# Patient Record
Sex: FEMALE | Race: WHITE | NOT HISPANIC OR LATINO | Employment: PART TIME
[De-identification: names, ages, dates, MRNs, and addresses within clinical notes are randomized per-mention and may not be internally consistent; named-entity substitution may affect disease eponyms.]

---

## 2019-05-01 ENCOUNTER — TRANSCRIBE ORDERS (OUTPATIENT)
Dept: SCHEDULING | Age: 54
End: 2019-05-01

## 2019-05-01 DIAGNOSIS — Z12.31 ENCOUNTER FOR SCREENING MAMMOGRAM FOR MALIGNANT NEOPLASM OF BREAST: Primary | ICD-10-CM

## 2019-05-10 ENCOUNTER — HOSPITAL ENCOUNTER (EMERGENCY)
Facility: HOSPITAL | Age: 54
Discharge: HOME | End: 2019-05-10
Attending: EMERGENCY MEDICINE
Payer: COMMERCIAL

## 2019-05-10 VITALS
HEIGHT: 60 IN | DIASTOLIC BLOOD PRESSURE: 60 MMHG | WEIGHT: 110 LBS | BODY MASS INDEX: 21.6 KG/M2 | TEMPERATURE: 98.9 F | SYSTOLIC BLOOD PRESSURE: 112 MMHG | RESPIRATION RATE: 18 BRPM | OXYGEN SATURATION: 99 % | HEART RATE: 70 BPM

## 2019-05-10 DIAGNOSIS — R19.7 VOMITING AND DIARRHEA: Primary | ICD-10-CM

## 2019-05-10 DIAGNOSIS — E87.6 HYPOKALEMIA: ICD-10-CM

## 2019-05-10 DIAGNOSIS — R11.10 VOMITING AND DIARRHEA: Primary | ICD-10-CM

## 2019-05-10 LAB
ANION GAP SERPL CALC-SCNC: 9 MEQ/L (ref 3–15)
ATRIAL RATE: 68
BASOPHILS # BLD: 0.06 K/UL (ref 0.01–0.1)
BASOPHILS NFR BLD: 1.3 %
BILIRUB UR QL STRIP.AUTO: NEGATIVE MG/DL
BILIRUB UR QL STRIP.AUTO: NEGATIVE MG/DL
BUN SERPL-MCNC: 11 MG/DL (ref 8–20)
CALCIUM SERPL-MCNC: 9.5 MG/DL (ref 8.9–10.3)
CHLORIDE SERPL-SCNC: 105 MEQ/L (ref 98–109)
CLARITY UR REFRACT.AUTO: CLEAR
CLARITY UR REFRACT.AUTO: CLEAR
CO2 SERPL-SCNC: 25 MEQ/L (ref 22–32)
COLOR UR AUTO: YELLOW
COLOR UR AUTO: YELLOW
CREAT SERPL-MCNC: 0.6 MG/DL
DIFFERENTIAL METHOD BLD: NORMAL
EOSINOPHIL # BLD: 0.07 K/UL (ref 0.04–0.36)
EOSINOPHIL NFR BLD: 1.5 %
ERYTHROCYTE [DISTWIDTH] IN BLOOD BY AUTOMATED COUNT: 11.9 % (ref 11.7–14.4)
GFR SERPL CREATININE-BSD FRML MDRD: >60 ML/MIN/1.73M*2
GLUCOSE BLD-MCNC: 132 MG/DL (ref 70–99)
GLUCOSE SERPL-MCNC: 145 MG/DL (ref 70–99)
GLUCOSE UR STRIP.AUTO-MCNC: NEGATIVE MG/DL
GLUCOSE UR STRIP.AUTO-MCNC: NEGATIVE MG/DL
HCT VFR BLDCO AUTO: 35.9 %
HGB BLD-MCNC: 12.4 G/DL
HGB UR QL STRIP.AUTO: ABNORMAL
HGB UR QL STRIP.AUTO: NEGATIVE
IMM GRANULOCYTES # BLD AUTO: 0.01 K/UL (ref 0–0.08)
IMM GRANULOCYTES NFR BLD AUTO: 0.2 %
KETONES UR STRIP.AUTO-MCNC: NEGATIVE MG/DL
KETONES UR STRIP.AUTO-MCNC: NEGATIVE MG/DL
LEUKOCYTE ESTERASE UR QL STRIP.AUTO: NEGATIVE
LEUKOCYTE ESTERASE UR QL STRIP.AUTO: NEGATIVE
LYMPHOCYTES # BLD: 1.5 K/UL (ref 1.2–3.5)
LYMPHOCYTES NFR BLD: 31.8 %
MCH RBC QN AUTO: 30.7 PG (ref 28–33.2)
MCHC RBC AUTO-ENTMCNC: 34.5 G/DL (ref 32.2–35.5)
MCV RBC AUTO: 88.9 FL (ref 83–98)
MONOCYTES # BLD: 0.36 K/UL (ref 0.28–0.8)
MONOCYTES NFR BLD: 7.6 %
NEUTROPHILS # BLD: 2.72 K/UL (ref 1.7–7)
NEUTS SEG NFR BLD: 57.6 %
NITRITE UR QL STRIP.AUTO: NEGATIVE
NITRITE UR QL STRIP.AUTO: NEGATIVE
NRBC BLD-RTO: 0 %
P AXIS: 90
PDW BLD AUTO: 9.3 FL (ref 9.4–12.3)
PH UR STRIP.AUTO: 7.5 [PH]
PH UR STRIP.AUTO: 8 [PH]
PLATELET # BLD AUTO: 277 K/UL
POCT TEST (UMAC): ABNORMAL
POCT TEST: ABNORMAL
POTASSIUM SERPL-SCNC: 3.2 MEQ/L (ref 3.6–5.1)
PR INTERVAL: 182
PROT UR QL STRIP.AUTO: NEGATIVE
PROT UR QL STRIP.AUTO: NEGATIVE
QRS DURATION: 76
QT INTERVAL: 430
QTC CALCULATION(BAZETT): 457
R AXIS: 71
RBC # BLD AUTO: 4.04 M/UL (ref 3.93–5.22)
SODIUM SERPL-SCNC: 139 MEQ/L (ref 136–144)
SP GR UR REFRACT.AUTO: 1.01
SP GR UR REFRACT.AUTO: 1.01
T WAVE AXIS: 91
TROPONIN I SERPL-MCNC: <0.03 NG/ML
UROBILINOGEN UR STRIP-ACNC: 0.2 EU/DL
UROBILINOGEN UR STRIP-ACNC: 0.2 EU/DL
VENTRICULAR RATE: 68
WBC # BLD AUTO: 4.72 K/UL

## 2019-05-10 PROCEDURE — 93010 ELECTROCARDIOGRAM REPORT: CPT | Performed by: INTERNAL MEDICINE

## 2019-05-10 PROCEDURE — 25800000 HC PHARMACY IV SOLUTIONS: Performed by: PHYSICIAN ASSISTANT

## 2019-05-10 PROCEDURE — 93005 ELECTROCARDIOGRAM TRACING: CPT | Performed by: EMERGENCY MEDICINE

## 2019-05-10 PROCEDURE — 80048 BASIC METABOLIC PNL TOTAL CA: CPT | Performed by: EMERGENCY MEDICINE

## 2019-05-10 PROCEDURE — 99284 EMERGENCY DEPT VISIT MOD MDM: CPT | Mod: 25

## 2019-05-10 PROCEDURE — 85025 COMPLETE CBC W/AUTO DIFF WBC: CPT

## 2019-05-10 PROCEDURE — 93005 ELECTROCARDIOGRAM TRACING: CPT

## 2019-05-10 PROCEDURE — 85025 COMPLETE CBC W/AUTO DIFF WBC: CPT | Performed by: EMERGENCY MEDICINE

## 2019-05-10 PROCEDURE — 36415 COLL VENOUS BLD VENIPUNCTURE: CPT

## 2019-05-10 PROCEDURE — 81003 URINALYSIS AUTO W/O SCOPE: CPT | Performed by: PHYSICIAN ASSISTANT

## 2019-05-10 PROCEDURE — 84484 ASSAY OF TROPONIN QUANT: CPT

## 2019-05-10 PROCEDURE — 63700000 HC SELF-ADMINISTRABLE DRUG: Performed by: PHYSICIAN ASSISTANT

## 2019-05-10 PROCEDURE — 84484 ASSAY OF TROPONIN QUANT: CPT | Performed by: EMERGENCY MEDICINE

## 2019-05-10 PROCEDURE — 80048 BASIC METABOLIC PNL TOTAL CA: CPT

## 2019-05-10 PROCEDURE — 63600000 HC DRUGS/DETAIL CODE

## 2019-05-10 RX ORDER — ONDANSETRON 4 MG/1
4 TABLET, ORALLY DISINTEGRATING ORAL EVERY 8 HOURS PRN
Qty: 12 TABLET | Refills: 0 | Status: SHIPPED | OUTPATIENT
Start: 2019-05-10 | End: 2020-07-21 | Stop reason: ALTCHOICE

## 2019-05-10 RX ORDER — ONDANSETRON HYDROCHLORIDE 2 MG/ML
4 INJECTION, SOLUTION INTRAVENOUS ONCE
Status: COMPLETED | OUTPATIENT
Start: 2019-05-10 | End: 2019-05-10

## 2019-05-10 RX ORDER — POTASSIUM CHLORIDE 750 MG/1
40 TABLET, FILM COATED, EXTENDED RELEASE ORAL ONCE
Status: COMPLETED | OUTPATIENT
Start: 2019-05-10 | End: 2019-05-10

## 2019-05-10 RX ORDER — ONDANSETRON HYDROCHLORIDE 2 MG/ML
INJECTION, SOLUTION INTRAVENOUS
Status: COMPLETED
Start: 2019-05-10 | End: 2019-05-10

## 2019-05-10 RX ADMIN — SODIUM CHLORIDE 1000 ML: 9 INJECTION, SOLUTION INTRAVENOUS at 08:41

## 2019-05-10 RX ADMIN — ONDANSETRON 4 MG: 2 INJECTION INTRAMUSCULAR; INTRAVENOUS at 08:40

## 2019-05-10 RX ADMIN — POTASSIUM CHLORIDE 40 MEQ: 750 TABLET, FILM COATED, EXTENDED RELEASE ORAL at 10:34

## 2019-05-10 RX ADMIN — ONDANSETRON HYDROCHLORIDE 4 MG: 2 INJECTION, SOLUTION INTRAVENOUS at 08:40

## 2019-05-10 RX ADMIN — SODIUM CHLORIDE 1000 ML: 900 INJECTION, SOLUTION INTRAVENOUS at 09:48

## 2019-05-10 ASSESSMENT — ENCOUNTER SYMPTOMS
CHILLS: 0
SHORTNESS OF BREATH: 0
MYALGIAS: 0
FEVER: 0
ABDOMINAL PAIN: 0
DIARRHEA: 1
HEADACHES: 0
VOMITING: 1
NAUSEA: 1
WEAKNESS: 0
DIZZINESS: 1
SPEECH DIFFICULTY: 0

## 2019-05-10 NOTE — DISCHARGE INSTRUCTIONS
Follow-up with PCP next week for reevaluation.  Please return sooner for new or worsening concerns

## 2019-05-10 NOTE — ED ATTESTATION NOTE
Mary Brandon was evaluated and managed by the physician assistant.  We discussed the plan of care, and I reviewed the relevant studies.       Mar Campos MD  05/10/19 3878

## 2019-05-10 NOTE — ED PROVIDER NOTES
HPI     Chief Complaint   Patient presents with   • Dizziness       Pt is a 54-year-old female who presents to ER for evaluation of N/V/D. She reports she has been fasting for Ramadan and this am at 4AM she had soup, rice beans and water. She reports about 3 hours later she started feeling dizzy, like the room was spinning, and then felt nauseous and started vomiting. She reports total of 6 episodes of vomiting and 3-4 episodes of loose, brown, watery stools. Pt denies fevers, chills, abdominal pain. She denies recent cold like sx, ear pain, tinnitus. Pt denies chest pain, sob. She denies sick contacts, recent abx, or travel.             Patient History     History reviewed. No pertinent past medical history.    Past Surgical History:   Procedure Laterality Date   • TONSILLECTOMY         History reviewed. No pertinent family history.    Social History   Substance Use Topics   • Smoking status: Never Smoker   • Smokeless tobacco: Never Used   • Alcohol use No       Systems Reviewed from Nursing Triage:          Review of Systems     Review of Systems   Constitutional: Negative for chills and fever.   Eyes: Negative for visual disturbance ( ).   Respiratory: Negative for shortness of breath.    Cardiovascular: Negative for chest pain.   Gastrointestinal: Positive for diarrhea, nausea and vomiting. Negative for abdominal pain.   Musculoskeletal: Negative for myalgias.   Skin: Negative for rash.   Neurological: Positive for dizziness. Negative for speech difficulty, weakness and headaches.        Physical Exam     ED Triage Vitals   Temp Heart Rate Resp BP SpO2   05/10/19 0810 05/10/19 0810 05/10/19 0810 05/10/19 0810 05/10/19 0810   36.4 °C (97.6 °F) 72 18 117/66 100 %      Temp Source Heart Rate Source Patient Position BP Location FiO2 (%) (Set)   05/10/19 0936 05/10/19 0936 05/10/19 0810 05/10/19 0810 --   Oral Monitor Sitting Right upper arm        Pulse Ox %: 100 % (05/10/19 0921)  Pulse Ox Interpretation: Normal  (05/10/19 0921)  Heart Rate: 72 (05/10/19 0921)  Rhythm Strip Interpretation: Normal Sinus Rhythm (05/10/19 0921)    No data found.          Physical Exam   Constitutional: She is oriented to person, place, and time. She appears distressed.   HENT:   Right Ear: Tympanic membrane and external ear normal.   Left Ear: Tympanic membrane and external ear normal.   Mouth/Throat: Oropharynx is clear and moist.   Eyes: Pupils are equal, round, and reactive to light.   Neck: Normal range of motion.   Cardiovascular: Regular rhythm and normal heart sounds.    Pulmonary/Chest: Breath sounds normal.   Abdominal: Soft. There is no tenderness.   Musculoskeletal: Normal range of motion.   Neurological: She is alert and oriented to person, place, and time. No cranial nerve deficit. Coordination normal.   Skin: Skin is warm and dry.   Nursing note and vitals reviewed.           Procedures    ED Course & MDM     Labs Reviewed   BASIC METABOLIC PANEL - Abnormal        Result Value    Sodium 139      Potassium 3.2 (*)     Chloride 105      CO2 25      BUN 11      Creatinine 0.6      Glucose 145 (*)     Calcium 9.5      eGFR >60.0      Anion Gap 9     CBC - Abnormal     WBC 4.72      RBC 4.04      Hemoglobin 12.4      Hematocrit 35.9      MCV 88.9      MCH 30.7      MCHC 34.5      RDW 11.9      Platelets 277      MPV 9.3 (*)    POCT GLUCOSE (BEAKER) - Abnormal     POCT Bedside Glucose 132 (*)     POC Test POC     POCT URINALYSIS (BEAKER) - Abnormal     Color, Urine Yellow      Clarity, Urine Clear      Specific Gravity, Urine 1.015      pH, Urine 8.0      Leukocytes, Urine Negative      Nitrite, Urine Negative      Protein, Urine Negative      Glucose, Urine Negative      Ketones, Urine Negative      Urobilinogen, Urine 0.2      Bilirubin, Urine Negative      Blood, Urine Trace-intact (*)     POC Test POC     TROPONIN I - Normal    Troponin I <0.03     UA REFLEX CULTURE (MACROSCOPIC) - Normal    Color, Urine Yellow      Clarity, Urine  Clear      Specific Gravity, Urine 1.006      pH, Urine 7.5      Leukocyte Esterase Negative      Nitrite, Urine Negative      Protein, Urine Negative      Glucose, Urine Negative      Ketones, Urine Negative      Urobilinogen, Urine 0.2      Bilirubin, Urine Negative      Blood, Urine Negative     CBC AND DIFFERENTIAL    Narrative:     The following orders were created for panel order CBC and differential.  Procedure                               Abnormality         Status                     ---------                               -----------         ------                     CBC[[id number]]                           Abnormal            Final result               Diff Count[[id number]]                                        Final result                 Please view results for these tests on the individual orders.   RAINBOW DRAW PANEL    Narrative:     The following orders were created for panel order [place] Draw Panel.  Procedure                               Abnormality         Status                     ---------                               -----------         ------                     RAINBOW LT BLUE[[id number]]                                   Final result                 Please view results for these tests on the individual orders.   DIFF COUNT    Differential Type Auto      nRBC 0.0      Immature Granulocytes 0.2      Neutrophils 57.6      Lymphocytes 31.8      Monocytes 7.6      Eosinophils 1.5      Basophils 1.3      Immature Granulocytes, Absolute 0.01      Neutrophils, Absolute 2.72      Lymphocytes, Absolute 1.50      Monocytes, Absolute 0.36      Eosinophils, Absolute 0.07      Basophils, Absolute 0.06     URINALYSIS REFLEX CULTURE    Narrative:     The following orders were created for panel order Urinalysis w/ reflex culture.  Procedure                               Abnormality         Status                     ---------                               -----------         ------                     UA  Reflex to Culture (Mac...[[id number]]  Normal              Final result                 Please view results for these tests on the individual orders.   RAINBOW LT BLUE       ECG 12 lead   Final Result                  MDM  Number of Diagnoses or Management Options  Hypokalemia:   Vomiting and diarrhea:   Diagnosis management comments: Pt presents to er for eval of v/d. She is fasting for Ramadan, abd soft, NT.  Labs reviewed  K 3.2     After antiemetics and 2L of fluids pt tolerating fluids and feeling better  Pt also seen by Dr. Campos who was in agreement with plan of care               ED Course as of May 13 2301   Fri May 10, 2019   1045 Walked to bathroom, feeling better  [AD]   1234 Tolerating POs  [AD]      ED Course User Index  [AD] Ladonna Walker PA C         Clinical Impressions as of May 13 2301   Vomiting and diarrhea   Hypokalemia        Ladonna Walker PA C  05/13/19 2301

## 2019-05-14 ENCOUNTER — TRANSCRIBE ORDERS (OUTPATIENT)
Dept: RADIOLOGY | Facility: HOSPITAL | Age: 54
End: 2019-05-14

## 2019-05-14 ENCOUNTER — HOSPITAL ENCOUNTER (OUTPATIENT)
Dept: RADIOLOGY | Facility: HOSPITAL | Age: 54
Discharge: HOME | End: 2019-05-14
Attending: PHYSICIAN ASSISTANT
Payer: COMMERCIAL

## 2019-05-14 DIAGNOSIS — Z12.31 ENCOUNTER FOR SCREENING MAMMOGRAM FOR MALIGNANT NEOPLASM OF BREAST: ICD-10-CM

## 2019-05-14 PROCEDURE — 77063 BREAST TOMOSYNTHESIS BI: CPT

## 2019-07-10 ENCOUNTER — TRANSCRIBE ORDERS (OUTPATIENT)
Dept: SCHEDULING | Age: 54
End: 2019-07-10

## 2019-07-10 DIAGNOSIS — R74.8 ABNORMAL LEVELS OF OTHER SERUM ENZYMES: Primary | ICD-10-CM

## 2019-07-24 ENCOUNTER — HOSPITAL ENCOUNTER (OUTPATIENT)
Dept: RADIOLOGY | Facility: HOSPITAL | Age: 54
Discharge: HOME | End: 2019-07-24
Attending: PHYSICIAN ASSISTANT
Payer: COMMERCIAL

## 2019-07-24 DIAGNOSIS — R74.8 ABNORMAL LEVELS OF OTHER SERUM ENZYMES: ICD-10-CM

## 2019-07-24 PROCEDURE — 76700 US EXAM ABDOM COMPLETE: CPT

## 2020-06-12 ENCOUNTER — HOSPITAL ENCOUNTER (OUTPATIENT)
Dept: RADIOLOGY | Facility: HOSPITAL | Age: 55
Discharge: HOME | End: 2020-06-12
Attending: FAMILY MEDICINE
Payer: COMMERCIAL

## 2020-06-12 ENCOUNTER — TRANSCRIBE ORDERS (OUTPATIENT)
Dept: RADIOLOGY | Facility: HOSPITAL | Age: 55
End: 2020-06-12

## 2020-06-12 DIAGNOSIS — Z12.31 ENCOUNTER FOR SCREENING MAMMOGRAM FOR MALIGNANT NEOPLASM OF BREAST: ICD-10-CM

## 2020-06-12 DIAGNOSIS — Z12.31 ENCOUNTER FOR SCREENING MAMMOGRAM FOR MALIGNANT NEOPLASM OF BREAST: Primary | ICD-10-CM

## 2020-06-12 PROCEDURE — 77067 SCR MAMMO BI INCL CAD: CPT

## 2020-07-21 ENCOUNTER — OFFICE VISIT (OUTPATIENT)
Dept: FAMILY MEDICINE | Facility: CLINIC | Age: 55
End: 2020-07-21
Payer: COMMERCIAL

## 2020-07-21 ENCOUNTER — TELEPHONE (OUTPATIENT)
Dept: SCHEDULING | Facility: CLINIC | Age: 55
End: 2020-07-21

## 2020-07-21 VITALS
WEIGHT: 114 LBS | SYSTOLIC BLOOD PRESSURE: 120 MMHG | DIASTOLIC BLOOD PRESSURE: 80 MMHG | OXYGEN SATURATION: 98 % | TEMPERATURE: 98.9 F | HEIGHT: 64 IN | HEART RATE: 79 BPM | BODY MASS INDEX: 19.46 KG/M2 | RESPIRATION RATE: 16 BRPM

## 2020-07-21 DIAGNOSIS — R79.89 ELEVATED LIVER FUNCTION TESTS: ICD-10-CM

## 2020-07-21 DIAGNOSIS — K76.1 HEPATIC CONGESTION: ICD-10-CM

## 2020-07-21 DIAGNOSIS — R76.8 ANA POSITIVE: Primary | ICD-10-CM

## 2020-07-21 PROCEDURE — 99203 OFFICE O/P NEW LOW 30 MIN: CPT | Performed by: INTERNAL MEDICINE

## 2020-07-21 ASSESSMENT — ENCOUNTER SYMPTOMS
SEIZURES: 0
SINUS PRESSURE: 0
DIARRHEA: 0
SHORTNESS OF BREATH: 0
PHOTOPHOBIA: 0
DIZZINESS: 0
EYE ITCHING: 0
APPETITE CHANGE: 0
FACIAL ASYMMETRY: 0
SINUS PAIN: 0
BLOOD IN STOOL: 0
POLYDIPSIA: 0
FEVER: 0
WEAKNESS: 0
DYSURIA: 0
PSYCHIATRIC NEGATIVE: 1
BACK PAIN: 0
DIAPHORESIS: 0
CHOKING: 0
LIGHT-HEADEDNESS: 0
WHEEZING: 0
COUGH: 0
TREMORS: 0
EYE REDNESS: 0
ABDOMINAL DISTENTION: 0
JOINT SWELLING: 0
CHILLS: 0
EYE DISCHARGE: 0
ADENOPATHY: 0
NAUSEA: 0
NECK PAIN: 0
UNEXPECTED WEIGHT CHANGE: 0
ANAL BLEEDING: 0
ACTIVITY CHANGE: 0
RHINORRHEA: 0
FATIGUE: 0
HEADACHES: 0
NECK STIFFNESS: 0
SPEECH DIFFICULTY: 0
DIFFICULTY URINATING: 0
VOICE CHANGE: 0
POLYPHAGIA: 0
BRUISES/BLEEDS EASILY: 0
PALPITATIONS: 0
NUMBNESS: 0
ABDOMINAL PAIN: 0
MYALGIAS: 0
SORE THROAT: 0
VOMITING: 0
EYE PAIN: 0
CONSTIPATION: 0
STRIDOR: 0
TROUBLE SWALLOWING: 0
FREQUENCY: 0
FLANK PAIN: 0
RECTAL PAIN: 0
ARTHRALGIAS: 0
APNEA: 0
HEMATURIA: 0
CHEST TIGHTNESS: 0

## 2020-07-21 NOTE — PROGRESS NOTES
Subjective      Patient ID: Mary Brandon is a 55 y.o. female.  1965      HPI     56 yo female here to establish care and up on chronic conditions. She is new to office. Prev f/b PCOM. She is f/b Dr. Mitchell-rheum for +NAEL. Also found to have elevated ast/alt in past. This prompted liver u/s and subsequent labs to be drawn. Referred to hepatology by rheumatology. Recently had liver bx by this provider which showed congestive hepatopathy. It was rec pt f/u with cardiology for echo, cardiac eval. Pt denies any known hx of CHF, CVA, HTN, CAD, CKD, DM, HLD, etc.she stays active and feels well. Denies any dyspnea at rest or with exertion, CP, orthopnea, PND, edema, LH, syncope, fatigue, palpitations. No n/v or abd pain. No fever or unexplained wt changes. No BM concerns.   Labs noted 5/2020 with nml cbc, ALT of 46, otw nml hepatic function panel.      The following have been reviewed and updated as appropriate in this visit:  Tobacco  Allergies  Meds  Surg Hx  Fam Hx  Soc Hx      Review of Systems   Constitutional: Negative for activity change, appetite change, chills, diaphoresis, fatigue, fever and unexpected weight change.   HENT: Negative for congestion, ear discharge, hearing loss, mouth sores, nosebleeds, postnasal drip, rhinorrhea, sinus pressure, sinus pain, sneezing, sore throat, tinnitus, trouble swallowing and voice change.    Eyes: Negative for photophobia, pain, discharge, redness, itching and visual disturbance.   Respiratory: Negative for apnea, cough, choking, chest tightness, shortness of breath, wheezing and stridor.    Cardiovascular: Negative for chest pain, palpitations and leg swelling.   Gastrointestinal: Negative for abdominal distention, abdominal pain, anal bleeding, blood in stool, constipation, diarrhea, nausea, rectal pain and vomiting.   Endocrine: Negative for cold intolerance, heat intolerance, polydipsia, polyphagia and polyuria.   Genitourinary: Negative for difficulty urinating,  "dysuria, flank pain, frequency, hematuria, pelvic pain and urgency.   Musculoskeletal: Negative for arthralgias, back pain, gait problem, joint swelling, myalgias, neck pain and neck stiffness.   Skin: Negative for rash.   Allergic/Immunologic: Negative for immunocompromised state.   Neurological: Negative for dizziness, tremors, seizures, syncope, facial asymmetry, speech difficulty, weakness, light-headedness, numbness and headaches.   Hematological: Negative for adenopathy. Does not bruise/bleed easily.   Psychiatric/Behavioral: Negative.        Objective     Vitals:    07/21/20 1129   BP: 120/80   BP Location: Left upper arm   Patient Position: Sitting   Pulse: 79   Resp: 16   Temp: 37.2 °C (98.9 °F)   TempSrc: Oral   SpO2: 98%   Weight: 51.7 kg (114 lb)   Height: 1.626 m (5' 4\")     Body mass index is 19.57 kg/m².    Physical Exam   Constitutional: She is oriented to person, place, and time. She appears well-developed and well-nourished. No distress.   HENT:   Right Ear: External ear normal.   Left Ear: External ear normal.   Eyes: Right eye exhibits no discharge. Left eye exhibits no discharge. No scleral icterus.   Neck: No JVD present. No tracheal deviation present. No thyromegaly present.   Cardiovascular: Normal rate, regular rhythm and normal heart sounds. Exam reveals no gallop and no friction rub.   No murmur heard.  Pulmonary/Chest: Effort normal and breath sounds normal. No stridor. No respiratory distress. She has no wheezes. She has no rales.   Abdominal: Soft. Bowel sounds are normal. She exhibits no distension and no mass. There is no tenderness. There is no guarding.   Musculoskeletal: She exhibits no edema.   Lymphadenopathy:     She has no cervical adenopathy.   Neurological: She is alert and oriented to person, place, and time.   Skin: She is not diaphoretic.   Psychiatric: She has a normal mood and affect. Her behavior is normal.   Vitals reviewed.      Assessment/Plan   Diagnoses and all " orders for this visit:    NAEL positive (Primary)  -seen by rheum. No sig sx. No further eval per Dr. Mitchell rec at this time. Notify with any new sx/concerns.     Elevated liver function tests/hepatic congestion  -elevated in 2015 then improved and over last yr found to be high again and eval including u/s, labs noted. F/b office of Dr. Serrano-hepatology and recent liver bx with congestive hepatopathy without any sig steatosis or fibrosis. Asymptomatic. Refer to cardiology for echo, etc. Plans to fu with GI if cardiac eval unremarkable for abd u/s with dopplers. Cont hepatology f/u.     -     Ambulatory referral to Cardiology; Future    HM  -recent mammo noted.   -refer gyn for annual exam.   -pt states due for her screening c-scope in near future. Encouraged fu with this. Obtain records.   -rec shingrix, pt declines today. She will check status tdap and update me. rec annual flu vaccine.

## 2020-07-21 NOTE — TELEPHONE ENCOUNTER
New Patient Appointment Request    Name of caller:Mary Brandon    Relationship to patient: self    Diagnosis: Per pt Hepatic congestion     Referred by: PCP Dr. Goldstein    Previous Cardiologist name and phone number: NA    Best contact number: [phone]    Additional notes: Pt.is  schd for 7/23

## 2020-07-23 ENCOUNTER — OFFICE VISIT (OUTPATIENT)
Dept: CARDIOLOGY | Facility: CLINIC | Age: 55
End: 2020-07-23
Payer: COMMERCIAL

## 2020-07-23 VITALS
OXYGEN SATURATION: 99 % | WEIGHT: 114 LBS | HEART RATE: 71 BPM | HEIGHT: 64 IN | SYSTOLIC BLOOD PRESSURE: 109 MMHG | BODY MASS INDEX: 19.46 KG/M2 | DIASTOLIC BLOOD PRESSURE: 75 MMHG

## 2020-07-23 DIAGNOSIS — R09.89 NONSPECIFIC ABNORMAL FINDING ON CARDIAC EVALUATION: Primary | ICD-10-CM

## 2020-07-23 PROCEDURE — 93000 ELECTROCARDIOGRAM COMPLETE: CPT | Performed by: INTERNAL MEDICINE

## 2020-07-23 PROCEDURE — 99244 OFF/OP CNSLTJ NEW/EST MOD 40: CPT | Performed by: INTERNAL MEDICINE

## 2020-07-23 RX ORDER — CETIRIZINE HYDROCHLORIDE 10 MG/1
10 TABLET ORAL AS NEEDED
COMMUNITY

## 2020-07-23 RX ORDER — IBUPROFEN 100 MG/5ML
500 SUSPENSION, ORAL (FINAL DOSE FORM) ORAL DAILY
COMMUNITY
End: 2024-11-08 | Stop reason: ALTCHOICE

## 2020-07-23 NOTE — LETTER
July 23, 2020     Tessie Bermeo DO  [address] [address]    Patient: Mary Brandon  YOB: 1965  Date of Visit: 7/23/2020      Dear Dr. Bermeo:    Thank you for referring Mary Brandon to me for evaluation. Below are my notes for this consultation.    If you have questions, please do not hesitate to call me. I look forward to following your patient along with you.         Sincerely,        Gama London MD        CC: MD Surya William Riti, MD  7/23/2020 11:14 AM  Signed       Cardiology Outpatient Note    [facility]    [facility]  [address]  [place] [address]     TEL  [phone]  [facility]     Mary Brandon is a 55 y.o. female referred for cardiac evaluation to assess for congestive heart failure.  She is being evaluated for mildly increased liver function tests and has seen a hepatologist.  Recent hepatic biopsy at [facility] showed congestive hepatopathy and she was asked to have cardiac evaluation to assess for congestive heart failure and left ventricular ejection fraction.    She denies a history of hypertension, diabetes, congestive heart failure, stroke, sleep apnea, pulmonary embolus, myocardial infarction, rheumatic heart disease, cardiac stenting, or cardiac surgery.  The patient is not on cardiac medications.  She denies being on statin medications.  She denies taking any over-the-counter drugs with the exception of a multivitamin.  She denies any significant alcohol use.  She does not drink caffeine.    She describes herself is very active and often climbs 3 flights of stairs to get to her apartment which is on the third floor.  She occasionally does Pilates at home.  She denies any chest pain, shortness of breath, palpitations, diaphoresis, orthopnea or syncope.    She has had 2 children.   She denies a history of preeclampsia or gestational diabetes or gestational hypertension.  She had a 6-month episode of anorexic tendencies at the age of 11 and then again at 21.  She denies use of Fen/Phen.  She denies having any cardiac issues at that time.  She had menopause around the age of 48.    Surgical history is notable for liver biopsy and tonsillectomy.  There is no clear family history of accelerated coronary artery disease.  The patient does not smoke.  She denies any drug use.    Patient screened negative for COVID-19 prior to liver biopsy.  Occasionally works as a  and part-time .  Denies any covid exposures.    Lab work is notable for mildly elevated ALT ranging from 43-57 in setting of positive NAEL 1:160.  Patient also tested positive for hepatitis A antibodies.  Biopsy was done given positive NAEL titer.  She denies symptoms suggestive of lupus or other autoimmune disease.                                                             [facility]     Medical History:History reviewed. No pertinent past medical history.    Surgical History:  Past Surgical History:   Procedure Laterality Date   • LIVER BIOPSY     • TONSILLECTOMY         Social History: reports that she has never smoked. She has never used smokeless tobacco. She reports that she does not drink alcohol or use drugs.    Family History: She indicated that her biological mother is . She indicated that her biological father is alive. She indicated that the status of her biological sister is unknown. She indicated that the status of her neg hx is unknown.      Allergies:Patient has no known allergies.    Current Medications:    Outpatient Encounter Medications as of 2020:   •  ascorbic acid, vitamin C, (vitamin C) 1,000 mg tablet, Take 500 mg by mouth daily.  •  cetirizine (ZyrTEC) 10 mg tablet, Take 10 mg by mouth as needed for allergies.  •  cholecalciferol, vitamin D3, (VITAMIN D3 ORAL), Take 2,000 mg by  "mouth daily.  •  fexofenadine HCl (ALLEGRA ORAL), Take 1 tablet by mouth as needed.  •  multivitamin tablet, Take 1 tablet by mouth daily.                                                          OBJECTIVE   ROS as in HPI   Constitution: Negative for chills and fever.   Eyes: Negative for blurred vision and visual disturbance.   Cardiovascular: Negative for chest pain, dyspnea on exertion, near-syncope, palpitations and syncope.   Respiratory: Negative for hemoptysis, shortness of breath and wheezing.    Hematologic/Lymphatic: Negative for bleeding problem.   Skin: Negative for rash. No new skin changes  Gastrointestinal: Negative for abdominal pain, diarrhea, hematochezia, melena, nausea and vomiting.   Genitourinary: Negative for dysuria and hematuria.   Neurological: Negative for headaches.   No history of HIV, hepatitis, opportunistic infection or blood transfusions         Vitals:    07/23/20 1024 07/23/20 1026   BP:  109/75   BP Location:  Right upper arm   Patient Position:  Sitting   Pulse:  71   SpO2:  99%   Weight: 51.7 kg (114 lb) 51.7 kg (114 lb)   Height: 1.626 m (5' 4\") 1.626 m (5' 4\")       BP Readings from Last 3 Encounters:   07/23/20 109/75   07/21/20 120/80   05/10/19 112/60     Wt Readings from Last 3 Encounters:   07/23/20 51.7 kg (114 lb)   07/21/20 51.7 kg (114 lb)   05/10/19 49.9 kg (110 lb)       Physical Exam   Constitutional: Appears comfortable in no distress  HEENT:  Neck Supple.  No JVD No carotid bruits no cervical lymphadenopathy  Head: Normocephalic.   Eyes: Extraocular movements intact  Cardiovascular: Normal rate, regular rhythm and normal heart sounds.  Exam reveals no friction rub.    Pulmonary/Chest: Effort normal and breath sounds normal. No wheezes.  Abdominal: Soft and nontender. Bowel sounds are normal.   Musculoskeletal: No lower extremity edema.   Neurological: Alert and oriented to person, place, and time.   Skin: Skin is warm and dry.   Psychiatric: Behavior is normal. "            Objective   No results found for: CHOL  No results found for: HDL  No results found for: LDLCALC  No results found for: TRIG  Lab Results   Component Value Date    ALT 48 04/21/2016    AST 45 (H) 04/21/2016     Lab Results   Component Value Date    WBC 4.72 05/10/2019    HGB 12.4 05/10/2019    HCT 35.9 05/10/2019     05/10/2019     Lab Results   Component Value Date    GLUCOSE 145 (H) 05/10/2019     05/10/2019    K 3.2 (L) 05/10/2019    CO2 25 05/10/2019     05/10/2019    BUN 11 05/10/2019    CREATININE 0.6 05/10/2019     No results found for: HGBA1C  No results found for: TSH  No results found for: BNP  [unfilled]    Troponin I Results       05/10/19 10/27/17 10/27/17                    0827 2128 1832         Troponin I <0.03 <0.03  A rise or fall of troponin with at least one abnormal value is consistent with myocardial injury or necrosis in the presence of appropriate clinical, ECG, and/or imaging abnormalities.   <0.03  A rise or fall of troponin with at least one abnormal value is consistent with myocardial injury or necrosis in the presence of appropriate clinical, ECG, and/or imaging abnormalities.                                                                            IMAGING                             EKG 7/23/2020   Sinus  Rhythm 71bpm QTC 429ms  WITHIN NORMAL LIMITS                                             ASSESSMENT AND PLAN   Ms. Brandon is a 55-year-old woman with the following issues:    The patient has a mildly elevated ALT in setting of positive NAEL titer 1:160.  Liver biopsy done shows congestive hepatopathy.  She was asked to see cardiology to exclude any cardiac dysfunction contributing to her liver issues.  By history and examination, there is no evidence of significant congestive heart failure, acute coronary syndrome, or significant valvular disease.  Patient has been scheduled for a transthoracic echocardiogram to confirm this and quantify left ventricular  function.  If the echocardiogram has no remarkable findings, patient does not require additional cardiac testing and will be contacted by phone with the results of the study.  This plan was discussed with her at length and all questions answered.    Assessment/Plan    No problem-specific Assessment & Plan notes found for this encounter.              Return if symptoms worsen or fail to improve.       Thank you for allowing me to participate in the care of this patient.  I hope this information is helpful.         Gama London MD [facility]  7/23/2020  11:13 AM    This document was generated utilizing voice recognition technology. A reasonable attempt at proofreading has been made to minimize errors but please excuse any typographical errors which may be present. Please call with any questions.

## 2020-07-23 NOTE — PROGRESS NOTES
Cardiology Outpatient Note    [facility]    [facility] Office  [address] [place] [address]     [facility]  The [facility] [address]  [address] [place] [address]     TEL  [phone]  [facility]     Mary Brandon is a 55 y.o. female referred for cardiac evaluation to assess for congestive heart failure.  She is being evaluated for mildly increased liver function tests and has seen a hepatologist.  Recent hepatic biopsy at [facility] showed congestive hepatopathy and she was asked to have cardiac evaluation to assess for congestive heart failure and left ventricular ejection fraction.    She denies a history of hypertension, diabetes, congestive heart failure, stroke, sleep apnea, pulmonary embolus, myocardial infarction, rheumatic heart disease, cardiac stenting, or cardiac surgery.  The patient is not on cardiac medications.  She denies being on statin medications.  She denies taking any over-the-counter drugs with the exception of a multivitamin.  She denies any significant alcohol use.  She does not drink caffeine.    She describes herself is very active and often climbs 3 flights of stairs to get to her apartment which is on the third floor.  She occasionally does Pilates at home.  She denies any chest pain, shortness of breath, palpitations, diaphoresis, orthopnea or syncope.    She has had 2 children.  She denies a history of preeclampsia or gestational diabetes or gestational hypertension.  She had a 6-month episode of anorexic tendencies at the age of 11 and then again at 21.  She denies use of Fen/Phen.  She denies having any cardiac issues at that time.  She had menopause around the age of 48.    Surgical history is notable for liver biopsy and tonsillectomy.  There is no clear family history of accelerated coronary artery disease.  The patient does not smoke.  She denies any drug use.    Patient screened negative  for COVID-19 prior to liver biopsy.  Occasionally works as a  and part-time .  Denies any covid exposures.    Lab work is notable for mildly elevated ALT ranging from 43-57 in setting of positive NAEL 1:160.  Patient also tested positive for hepatitis A antibodies.  Biopsy was done given positive NAEL titer.  She denies symptoms suggestive of lupus or other autoimmune disease.                                                             [facility]     Medical History:History reviewed. No pertinent past medical history.    Surgical History:  Past Surgical History:   Procedure Laterality Date   • LIVER BIOPSY     • TONSILLECTOMY         Social History: reports that she has never smoked. She has never used smokeless tobacco. She reports that she does not drink alcohol or use drugs.    Family History: She indicated that her biological mother is . She indicated that her biological father is alive. She indicated that the status of her biological sister is unknown. She indicated that the status of her neg hx is unknown.      Allergies:Patient has no known allergies.    Current Medications:    Outpatient Encounter Medications as of 2020:   •  ascorbic acid, vitamin C, (vitamin C) 1,000 mg tablet, Take 500 mg by mouth daily.  •  cetirizine (ZyrTEC) 10 mg tablet, Take 10 mg by mouth as needed for allergies.  •  cholecalciferol, vitamin D3, (VITAMIN D3 ORAL), Take 2,000 mg by mouth daily.  •  fexofenadine HCl (ALLEGRA ORAL), Take 1 tablet by mouth as needed.  •  multivitamin tablet, Take 1 tablet by mouth daily.                                                          OBJECTIVE   ROS as in HPI   Constitution: Negative for chills and fever.   Eyes: Negative for blurred vision and visual disturbance.   Cardiovascular: Negative for chest pain, dyspnea on exertion, near-syncope, palpitations and syncope.   Respiratory: Negative for hemoptysis, shortness of breath and wheezing.   "  Hematologic/Lymphatic: Negative for bleeding problem.   Skin: Negative for rash. No new skin changes  Gastrointestinal: Negative for abdominal pain, diarrhea, hematochezia, melena, nausea and vomiting.   Genitourinary: Negative for dysuria and hematuria.   Neurological: Negative for headaches.   No history of HIV, hepatitis, opportunistic infection or blood transfusions         Vitals:    07/23/20 1024 07/23/20 1026   BP:  109/75   BP Location:  Right upper arm   Patient Position:  Sitting   Pulse:  71   SpO2:  99%   Weight: 51.7 kg (114 lb) 51.7 kg (114 lb)   Height: 1.626 m (5' 4\") 1.626 m (5' 4\")       BP Readings from Last 3 Encounters:   07/23/20 109/75   07/21/20 120/80   05/10/19 112/60     Wt Readings from Last 3 Encounters:   07/23/20 51.7 kg (114 lb)   07/21/20 51.7 kg (114 lb)   05/10/19 49.9 kg (110 lb)       Physical Exam   Constitutional: Appears comfortable in no distress  HEENT:  Neck Supple.  No JVD No carotid bruits no cervical lymphadenopathy  Head: Normocephalic.   Eyes: Extraocular movements intact  Cardiovascular: Normal rate, regular rhythm and normal heart sounds.  Exam reveals no friction rub.    Pulmonary/Chest: Effort normal and breath sounds normal. No wheezes.  Abdominal: Soft and nontender. Bowel sounds are normal.   Musculoskeletal: No lower extremity edema.   Neurological: Alert and oriented to person, place, and time.   Skin: Skin is warm and dry.   Psychiatric: Behavior is normal.            Objective   No results found for: CHOL  No results found for: HDL  No results found for: LDLCALC  No results found for: TRIG  Lab Results   Component Value Date    ALT 48 04/21/2016    AST 45 (H) 04/21/2016     Lab Results   Component Value Date    WBC 4.72 05/10/2019    HGB 12.4 05/10/2019    HCT 35.9 05/10/2019     05/10/2019     Lab Results   Component Value Date    GLUCOSE 145 (H) 05/10/2019     05/10/2019    K 3.2 (L) 05/10/2019    CO2 25 05/10/2019     05/10/2019    " BUN 11 05/10/2019    CREATININE 0.6 05/10/2019     No results found for: HGBA1C  No results found for: TSH  No results found for: BNP  [unfilled]    Troponin I Results       05/10/19 10/27/17 10/27/17                    0827 2128 1832         Troponin I <0.03 <0.03  A rise or fall of troponin with at least one abnormal value is consistent with myocardial injury or necrosis in the presence of appropriate clinical, ECG, and/or imaging abnormalities.   <0.03  A rise or fall of troponin with at least one abnormal value is consistent with myocardial injury or necrosis in the presence of appropriate clinical, ECG, and/or imaging abnormalities.                                                                            IMAGING                             EKG 7/23/2020   Sinus  Rhythm 71bpm QTC 429ms  WITHIN NORMAL LIMITS                                             ASSESSMENT AND PLAN   Ms. Brandon is a 55-year-old woman with the following issues:    The patient has a mildly elevated ALT in setting of positive NAEL titer 1:160.  Liver biopsy done shows congestive hepatopathy.  She was asked to see cardiology to exclude any cardiac dysfunction contributing to her liver issues.  By history and examination, there is no evidence of significant congestive heart failure, acute coronary syndrome, or significant valvular disease.  Patient has been scheduled for a transthoracic echocardiogram to confirm this and quantify left ventricular function.  If the echocardiogram has no remarkable findings, patient does not require additional cardiac testing and will be contacted by phone with the results of the study.  This plan was discussed with her at length and all questions answered.    Assessment/Plan    No problem-specific Assessment & Plan notes found for this encounter.              Return if symptoms worsen or fail to improve.       Thank you for allowing me to participate in the care of this patient.  I hope this information is  helpful.         Gama London MD [facility]  7/23/2020  11:13 AM    This document was generated utilizing voice recognition technology. A reasonable attempt at proofreading has been made to minimize errors but please excuse any typographical errors which may be present. Please call with any questions.

## 2020-07-24 ENCOUNTER — TELEPHONE (OUTPATIENT)
Dept: FAMILY MEDICINE | Facility: CLINIC | Age: 55
End: 2020-07-24

## 2020-07-24 ENCOUNTER — TELEPHONE (OUTPATIENT)
Dept: CARDIOLOGY | Facility: CLINIC | Age: 55
End: 2020-07-24

## 2020-07-24 DIAGNOSIS — Z86.59 HISTORY OF EATING DISORDER: Primary | ICD-10-CM

## 2020-07-24 DIAGNOSIS — Z13.820 SCREENING FOR OSTEOPOROSIS: ICD-10-CM

## 2020-08-20 ENCOUNTER — HOSPITAL ENCOUNTER (OUTPATIENT)
Dept: RADIOLOGY | Facility: HOSPITAL | Age: 55
Discharge: HOME | End: 2020-08-20
Attending: INTERNAL MEDICINE
Payer: COMMERCIAL

## 2020-08-20 DIAGNOSIS — Z86.59 HISTORY OF EATING DISORDER: ICD-10-CM

## 2020-08-20 DIAGNOSIS — Z13.820 SCREENING FOR OSTEOPOROSIS: ICD-10-CM

## 2020-08-20 PROCEDURE — 77080 DXA BONE DENSITY AXIAL: CPT

## 2020-08-21 ENCOUNTER — TELEPHONE (OUTPATIENT)
Dept: FAMILY MEDICINE | Facility: CLINIC | Age: 55
End: 2020-08-21

## 2020-08-21 ENCOUNTER — HOSPITAL ENCOUNTER (OUTPATIENT)
Dept: CARDIOLOGY | Facility: HOSPITAL | Age: 55
Discharge: HOME | End: 2020-08-21
Attending: INTERNAL MEDICINE
Payer: COMMERCIAL

## 2020-08-21 VITALS
DIASTOLIC BLOOD PRESSURE: 60 MMHG | BODY MASS INDEX: 19.46 KG/M2 | SYSTOLIC BLOOD PRESSURE: 110 MMHG | WEIGHT: 114 LBS | HEIGHT: 64 IN

## 2020-08-21 DIAGNOSIS — R09.89 NONSPECIFIC ABNORMAL FINDING ON CARDIAC EVALUATION: ICD-10-CM

## 2020-08-21 PROCEDURE — 93306 TTE W/DOPPLER COMPLETE: CPT

## 2020-08-21 PROCEDURE — 93306 TTE W/DOPPLER COMPLETE: CPT | Mod: 26 | Performed by: INTERNAL MEDICINE

## 2020-08-21 RX ORDER — ALENDRONATE SODIUM 70 MG/1
70 TABLET ORAL WEEKLY
Qty: 4 TABLET | Refills: 3 | Status: SHIPPED | OUTPATIENT
Start: 2020-08-21 | End: 2020-12-14

## 2020-08-21 NOTE — TELEPHONE ENCOUNTER
Spoke with pt regarding dexa. She reports remote hx of eating d/o. No prior dexa for comparison. OP present. Discussed tx options. rx alendronate 70 weekly. SE profile discussed in detail including but not limited to GI s.e., myalgias, atypical femur fx, jaw osteonecrosis. Pt will call with concerns. Fu with gyn. Wt bearing exercises. Vit D/ca discussed. Following up with echo today.

## 2020-08-26 ENCOUNTER — TELEPHONE (OUTPATIENT)
Dept: SCHEDULING | Facility: CLINIC | Age: 55
End: 2020-08-26

## 2020-08-26 LAB
AORTIC ROOT ANNULUS - M-MODE: 3.21 CM
BSA FOR ECHO PROCEDURE: 1.53 M2
CUSP SEPARATION: 2.22 CM
DOP CALC LVOT STROKE VOLUME: 62.64 ML
DOP CALC RVOT VTI: 11.23 CM
E WAVE DECELERATION TIME: 226.77 MS
E/A RATIO: 1.54
E/E' RATIO: 8.89
E/LAT E' RATIO: 6.19
EDV (BP): 52.77 ML
EF (A4C): 62.6 %
EF A2C: 72.91 %
EJECTION FRACTION: 67.12 %
ESV (BP): 17.35 ML
INTERVENTRICULAR SEPTUM: 0.61 CM
LA ESV (BP): 19.35 ML
LA ESV INDEX (A2C): 13.79 ML/M2
LA ESV INDEX (BP): 12.65 ML/M2
LAAS-AP2: 10.01 CM2
LAAS-AP4: 8.01 CM2
LAV-S: 21.1 ML
LEFT ATRIUM VOLUME INDEX: 10.07 ML/M2
LEFT ATRIUM VOLUME: 15.4 ML
LEFT INTERNAL DIMENSION IN SYSTOLE: 2.72 CM (ref 2.26–3.42)
LEFT VENTRICLE DIASTOLIC VOLUME INDEX: 33.2 ML/M2
LEFT VENTRICLE DIASTOLIC VOLUME: 50.8 ML
LEFT VENTRICLE MASS INDEX: 59.58 G/M2
LEFT VENTRICLE SYSTOLIC VOLUME INDEX: 12.42 ML/M2
LEFT VENTRICLE SYSTOLIC VOLUME: 19 ML
LEFT VENTRICULAR INTERNAL DIMENSION IN DIASTOLE: 4.24 CM (ref 3.8–5.27)
LEFT VENTRICULAR MASS: 91.16 G
LEFT VENTRICULAR POSTERIOR WALL IN END DIASTOLE: 0.85 CM (ref 0.49–0.9)
LV DIASTOLIC VOLUME: 53.9 ML
LV ESV (APICAL 2 CHAMBER): 14.6 ML
LVCI: 1.6 LITERS PER MINUTE PER SQUARE METER
LVCO: 2.46 LITERS PER MINUTE
LVEDVI(A2C): 35.23 ML/M2
LVEDVI(BP): 34.49 ML/M2
LVESVI(A2C): 9.54 ML/M2
LVESVI(BP): 11.34 ML/M2
LVOT 2D: 1.98 CM
LVOT A: 3.08 CM2
LVOT MG: 1.96 MMHG
LVOT MV: 0.66 M/S
LVOT PEAK VELOCITY: 0.98 M/S
LVOT VTI: 20.35 CM
MV E'TISSUE VEL-LAT: 0.12 M/S
MV E'TISSUE VEL-MED: 0.08 M/S
MV PEAK A VEL: 0.49 M/S
MV PEAK E VEL: 0.75 M/S
MV VALVE AREA P 1/2 METHOD: 3.35 CM2
Z-SCORE OF LEFT VENTRICULAR DIMENSION IN END DIASTOLE: -0.54
Z-SCORE OF LEFT VENTRICULAR DIMENSION IN END SYSTOLE: -0.17
Z-SCORE OF LEFT VENTRICULAR POSTERIOR WALL IN END DIASTOLE: 1.3

## 2020-08-26 NOTE — TELEPHONE ENCOUNTER
I called the patient already and left a message as it went to voicemail  -Told her the echo was essentially normal.  No significant abnormalities and it does not look like her heart has anything to do with the liver issues that she is dealing with  -She does not need additional cardiac testing or follow-up at this time  -if she calls back she can be told this.

## 2020-11-30 ENCOUNTER — OFFICE VISIT (OUTPATIENT)
Dept: OBSTETRICS AND GYNECOLOGY | Facility: CLINIC | Age: 55
End: 2020-11-30
Payer: COMMERCIAL

## 2020-11-30 VITALS
BODY MASS INDEX: 20.38 KG/M2 | DIASTOLIC BLOOD PRESSURE: 70 MMHG | SYSTOLIC BLOOD PRESSURE: 120 MMHG | HEIGHT: 63 IN | WEIGHT: 115 LBS

## 2020-11-30 DIAGNOSIS — Z12.4 PAP SMEAR FOR CERVICAL CANCER SCREENING: ICD-10-CM

## 2020-11-30 DIAGNOSIS — Z01.419 ENCOUNTER FOR GYNECOLOGICAL EXAMINATION WITHOUT ABNORMAL FINDING: Primary | ICD-10-CM

## 2020-11-30 PROCEDURE — S0610 ANNUAL GYNECOLOGICAL EXAMINA: HCPCS | Performed by: OBSTETRICS & GYNECOLOGY

## 2020-12-01 ENCOUNTER — TELEPHONE (OUTPATIENT)
Dept: FAMILY MEDICINE | Facility: CLINIC | Age: 55
End: 2020-12-01

## 2020-12-01 DIAGNOSIS — R79.89 ELEVATED LIVER FUNCTION TESTS: ICD-10-CM

## 2020-12-01 DIAGNOSIS — R76.8 ANA POSITIVE: Primary | ICD-10-CM

## 2020-12-01 NOTE — TELEPHONE ENCOUNTER
----- Message from Jaspreet Goldstein MD sent at 12/1/2020  9:06 AM EST -----  Regarding: Non-Urgent Medical Question  Contact: [phone]      ----- Message -----  From: Blair, Corinne N, MA  Sent: 12/1/2020   8:29 AM EST  To: Jaspreet Goldstein MD  Subject: Non-Urgent Medical Question                          ----- Message -----  From: Mary Brandon  Sent: 12/1/2020   3:55 AM EST  To: Leanna Corrigan [facility] Clinical Support P  Subject: Non-Urgent Medical Question                      Good morning, Dr. Goldstein.    I wondered if it would be possible for  you to order me a CBC and metabolic panel?  I am considering looking for a new liver doctor for a second opinion of elevated enzymes and NAEL, but wanted to know if liver enzymes are elevated at this time.    Thank you in advance,  Mary Brandon

## 2020-12-03 LAB
CYTOLOGIST CVX/VAG CYTO: NORMAL
CYTOLOGY CVX/VAG DOC CYTO: NORMAL
CYTOLOGY CVX/VAG DOC THIN PREP: NORMAL
DX ICD CODE: NORMAL
HPV I/H RISK 4 DNA CVX QL PROBE+SIG AMP: NEGATIVE
LAB CORP NOTE:: NORMAL
OTHER STN SPEC: NORMAL
STAT OF ADQ CVX/VAG CYTO-IMP: NORMAL

## 2020-12-10 ENCOUNTER — APPOINTMENT (RX ONLY)
Dept: URBAN - METROPOLITAN AREA CLINIC 28 | Facility: CLINIC | Age: 55
Setting detail: DERMATOLOGY
End: 2020-12-10

## 2020-12-10 DIAGNOSIS — D22 MELANOCYTIC NEVI: ICD-10-CM

## 2020-12-10 DIAGNOSIS — L81.4 OTHER MELANIN HYPERPIGMENTATION: ICD-10-CM

## 2020-12-10 DIAGNOSIS — D485 NEOPLASM OF UNCERTAIN BEHAVIOR OF SKIN: ICD-10-CM

## 2020-12-10 PROBLEM — D22.5 MELANOCYTIC NEVI OF TRUNK: Status: ACTIVE | Noted: 2020-12-10

## 2020-12-10 PROBLEM — D48.5 NEOPLASM OF UNCERTAIN BEHAVIOR OF SKIN: Status: ACTIVE | Noted: 2020-12-10

## 2020-12-10 PROBLEM — D22.62 MELANOCYTIC NEVI OF LEFT UPPER LIMB, INCLUDING SHOULDER: Status: ACTIVE | Noted: 2020-12-10

## 2020-12-10 PROBLEM — D22.61 MELANOCYTIC NEVI OF RIGHT UPPER LIMB, INCLUDING SHOULDER: Status: ACTIVE | Noted: 2020-12-10

## 2020-12-10 PROCEDURE — ? PHOTO-DOCUMENTATION

## 2020-12-10 PROCEDURE — 99213 OFFICE O/P EST LOW 20 MIN: CPT

## 2020-12-10 PROCEDURE — ? COUNSELING

## 2020-12-10 PROCEDURE — ? DEFER

## 2020-12-10 ASSESSMENT — LOCATION SIMPLE DESCRIPTION DERM
LOCATION SIMPLE: LEFT THIGH
LOCATION SIMPLE: LEFT UPPER ARM
LOCATION SIMPLE: ABDOMEN
LOCATION SIMPLE: RIGHT UPPER ARM
LOCATION SIMPLE: RIGHT THIGH
LOCATION SIMPLE: RIGHT POSTERIOR UPPER ARM
LOCATION SIMPLE: LEFT UPPER BACK
LOCATION SIMPLE: LEFT POSTERIOR UPPER ARM

## 2020-12-10 ASSESSMENT — LOCATION DETAILED DESCRIPTION DERM
LOCATION DETAILED: RIGHT ANTERIOR PROXIMAL THIGH
LOCATION DETAILED: LEFT DISTAL POSTERIOR UPPER ARM
LOCATION DETAILED: RIGHT PROXIMAL POSTERIOR UPPER ARM
LOCATION DETAILED: LEFT ANTERIOR DISTAL UPPER ARM
LOCATION DETAILED: LEFT ANTERIOR DISTAL THIGH
LOCATION DETAILED: EPIGASTRIC SKIN
LOCATION DETAILED: LEFT SUPERIOR MEDIAL UPPER BACK
LOCATION DETAILED: RIGHT ANTERIOR DISTAL UPPER ARM

## 2020-12-10 ASSESSMENT — LOCATION ZONE DERM
LOCATION ZONE: ARM
LOCATION ZONE: LEG
LOCATION ZONE: TRUNK

## 2020-12-17 LAB
ALBUMIN SERPL-MCNC: 4.1 G/DL (ref 3.8–4.9)
ALP SERPL-CCNC: 106 IU/L (ref 39–117)
ALT SERPL-CCNC: 56 IU/L (ref 0–32)
AST SERPL-CCNC: 53 IU/L (ref 0–40)
BASOPHILS # BLD AUTO: 0.1 X10E3/UL (ref 0–0.2)
BASOPHILS NFR BLD AUTO: 1 %
BILIRUB DIRECT SERPL-MCNC: 0.12 MG/DL (ref 0–0.4)
BILIRUB SERPL-MCNC: 0.3 MG/DL (ref 0–1.2)
BUN SERPL-MCNC: 18 MG/DL (ref 6–24)
BUN/CREAT SERPL: 32 (ref 9–23)
CALCIUM SERPL-MCNC: 9.2 MG/DL (ref 8.7–10.2)
CHLORIDE SERPL-SCNC: 104 MMOL/L (ref 96–106)
CO2 SERPL-SCNC: 26 MMOL/L (ref 20–29)
CREAT SERPL-MCNC: 0.56 MG/DL (ref 0.57–1)
EOSINOPHIL # BLD AUTO: 0.4 X10E3/UL (ref 0–0.4)
EOSINOPHIL NFR BLD AUTO: 6 %
ERYTHROCYTE [DISTWIDTH] IN BLOOD BY AUTOMATED COUNT: 11.7 % (ref 11.7–15.4)
GLUCOSE SERPL-MCNC: 84 MG/DL (ref 65–99)
HCT VFR BLD AUTO: 36.2 % (ref 34–46.6)
HGB BLD-MCNC: 12.4 G/DL (ref 11.1–15.9)
IMM GRANULOCYTES # BLD AUTO: 0 X10E3/UL (ref 0–0.1)
IMM GRANULOCYTES NFR BLD AUTO: 0 %
LAB CORP EGFR IF AFRICN AM: 120 ML/MIN/1.73
LAB CORP EGFR IF NONAFRICN AM: 104 ML/MIN/1.73
LYMPHOCYTES # BLD AUTO: 1.6 X10E3/UL (ref 0.7–3.1)
LYMPHOCYTES NFR BLD AUTO: 26 %
MCH RBC QN AUTO: 31.3 PG (ref 26.6–33)
MCHC RBC AUTO-ENTMCNC: 34.3 G/DL (ref 31.5–35.7)
MCV RBC AUTO: 91 FL (ref 79–97)
MONOCYTES # BLD AUTO: 0.4 X10E3/UL (ref 0.1–0.9)
MONOCYTES NFR BLD AUTO: 6 %
NEUTROPHILS # BLD AUTO: 3.7 X10E3/UL (ref 1.4–7)
NEUTROPHILS NFR BLD AUTO: 61 %
PLATELET # BLD AUTO: 274 X10E3/UL (ref 150–450)
POTASSIUM SERPL-SCNC: 3.9 MMOL/L (ref 3.5–5.2)
PROT SERPL-MCNC: 6.3 G/DL (ref 6–8.5)
RBC # BLD AUTO: 3.96 X10E6/UL (ref 3.77–5.28)
SODIUM SERPL-SCNC: 139 MMOL/L (ref 134–144)
WBC # BLD AUTO: 6.1 X10E3/UL (ref 3.4–10.8)

## 2021-01-03 NOTE — TELEPHONE ENCOUNTER
----- Message from Mary Brandon sent at 7/24/2020 12:17 PM EDT -----  Regarding: RE: Visit Follow-Up Question  Contact: [phone]  Thank you so much.    Mary  ----- Message -----  From: Jaspreet Goldstein MD  Sent: 7/24/2020 11:24 AM EDT  To: Mary Brandon  Subject: RE: Visit Follow-Up Question  I am happy to order a dexa scan. I will place an order for this and mail this out to you. Your insurance company would have to tell you about coverage for this study. They will likely say it depends on the codes your provider uses and if it helps you can let them know we will code for osteoporosis screening and history of eating disorder.       ----- Message -----     From: Mary Brandon     Sent: 7/24/2020  9:26 AM EDT       To: Jaspreet Goldstein MD  Subject: Visit Follow-Up Question    Hi, Dr. Goldstein.    I met you this past Tuesday, 7/21, to establish care as a new patient.  At the visit, you gave me a referral to see a cardiologist based on my liver biopsy results and I met with Dr. London on Thursday, 7/23. The visit went well and in conversation, Dr. London asked if I had ever had a DEXA scan checking for osteoporosis.  One reason I believe she asked was I had had an eating disorder as a child, age 11, at which time I lost my period. I had a relapse at age 21, at which time I lost my period again.      I was wondering if you thought this may be a good idea and if so would it be possible to schedule something like that?  I don't know if insurance covers this sort of thing, so I would need to know the cost if not.    Thank you for your time and I look forward to your reply.    Sincerely,  Mary Brandon    
no

## 2021-01-15 ENCOUNTER — TELEPHONE (OUTPATIENT)
Dept: INFECTIOUS DISEASES | Facility: HOSPITAL | Age: 56
End: 2021-01-15

## 2021-01-15 ENCOUNTER — TELEMEDICINE (OUTPATIENT)
Dept: FAMILY MEDICINE | Facility: CLINIC | Age: 56
End: 2021-01-15
Payer: COMMERCIAL

## 2021-01-15 ENCOUNTER — TELEPHONE (OUTPATIENT)
Dept: FAMILY MEDICINE | Facility: CLINIC | Age: 56
End: 2021-01-15

## 2021-01-15 ENCOUNTER — CLINICAL SUPPORT (OUTPATIENT)
Dept: OTHER | Facility: CLINIC | Age: 56
End: 2021-01-15
Attending: INTERNAL MEDICINE
Payer: COMMERCIAL

## 2021-01-15 DIAGNOSIS — J06.9 UPPER RESPIRATORY TRACT INFECTION, UNSPECIFIED TYPE: Primary | ICD-10-CM

## 2021-01-15 DIAGNOSIS — R09.81 NASAL CONGESTION: ICD-10-CM

## 2021-01-15 DIAGNOSIS — R09.81 NASAL CONGESTION: Primary | ICD-10-CM

## 2021-01-15 PROCEDURE — 99212 OFFICE O/P EST SF 10 MIN: CPT | Mod: GT | Performed by: INTERNAL MEDICINE

## 2021-01-15 ASSESSMENT — ENCOUNTER SYMPTOMS
WHEEZING: 0
PALPITATIONS: 0
SEIZURES: 0
ABDOMINAL DISTENTION: 0
DIZZINESS: 0
DIFFICULTY URINATING: 0
SPEECH DIFFICULTY: 0
APPETITE CHANGE: 0
POLYDIPSIA: 0
SHORTNESS OF BREATH: 0
ACTIVITY CHANGE: 0
UNEXPECTED WEIGHT CHANGE: 0
EYE PAIN: 0
EYE REDNESS: 0
BLOOD IN STOOL: 0
NAUSEA: 0
COUGH: 0
DIARRHEA: 0
VOMITING: 0
FLANK PAIN: 0
APNEA: 0
HEMATURIA: 0
CHOKING: 0
DIAPHORESIS: 0
DYSURIA: 0
EYE DISCHARGE: 0
FREQUENCY: 0
POLYPHAGIA: 0
CHILLS: 0
FEVER: 0
CHEST TIGHTNESS: 0
STRIDOR: 0
CONSTIPATION: 0
EYE ITCHING: 0
PHOTOPHOBIA: 0
ABDOMINAL PAIN: 0
FACIAL ASYMMETRY: 0
ANAL BLEEDING: 0
LIGHT-HEADEDNESS: 0
RECTAL PAIN: 0

## 2021-01-15 NOTE — PROGRESS NOTES
Patient was processed today at [facility]-19 drive-up clinic.  Pt denies any sort of medical distress today.  After identifying the patient, a nasopharyngeal sample was obtained and placed in viral transport medium.  Pt was given a post-collection education sheet and encouraged to self-isolate until they receive the results.  Pt directed to [facility] website for [facility] Privacy Practices.  Sample sent to the lab noted on the order and requisition.  Pt was without additional questions or concerns and was dispositioned from the testing area to home.

## 2021-01-15 NOTE — PROGRESS NOTES
Verification of Patient Location:  The patient affirms they are currently located in the following state: [place]    Request for Consent:    Video Encounter   Hello, my name is Jaspreet Goldstein MD.  Before we proceed, can you please verify your identification by telling me your full name and date of birth?  Can you tell me who is in the room with you?    You and I are about to have a telemedicine check-in or visit because you have requested it.  This is a live video-conference.  I am a real person, speaking to you in real time.  There is no one else with me on the video-conference.  However, when we use (TimeBridge, Emulis, etc) it is important for you to know that the video-conference may not be secure or private.  I am not recording this conversation and I am asking you not to record it.  This telemedicine visit will be billed to your health insurance or you, if you are self-insured.  You understand you will be responsible for any copayments or coinsurances that apply to your telemedicine visit.  Communication platform used for this encounter:  Doximity     Before starting our telemedicine visit, I am required to get your consent for this virtual check-in or visit by telemedicine. Do you consent?      Patient Response to Request for Consent:  Yes      Visit Documentation:  Subjective     Patient ID: Mary Brandon is a 55 y.o. female.  1965      HPI     54 yo female c/o cold sx for about 10 days. Notes R ear discomfort. No fever. +nasal congestion. No cough. Some fatigue. No body aches. No chills. No sore throat. Initially had HA-this is improving. Nasal congestion improving as well. No n/v/d. No sob/cp. No loss taste/smell. No sick contacts. Decongestant and tylenol helps sx. Overall sx improving.     The following have been reviewed and updated as appropriate in this visit:  Tobacco  Allergies  Meds  Problems  Med Hx  Surg Hx  Fam Hx       Review of Systems   Constitutional: Negative for activity  change, appetite change, chills, diaphoresis, fever and unexpected weight change.   Eyes: Negative for photophobia, pain, discharge, redness, itching and visual disturbance.   Respiratory: Negative for apnea, cough, choking, chest tightness, shortness of breath, wheezing and stridor.    Cardiovascular: Negative for chest pain, palpitations and leg swelling.   Gastrointestinal: Negative for abdominal distention, abdominal pain, anal bleeding, blood in stool, constipation, diarrhea, nausea, rectal pain and vomiting.   Endocrine: Negative for cold intolerance, heat intolerance, polydipsia, polyphagia and polyuria.   Genitourinary: Negative for decreased urine volume, difficulty urinating, dysuria, enuresis, flank pain, frequency, genital sores, hematuria and urgency.   Neurological: Negative for dizziness, seizures, syncope, facial asymmetry, speech difficulty and light-headedness.         Assessment/Plan   1. URI/R ear pain-overall sx improving. Cont with supportive care, rest, fluids, tylenol prn, decongestant, etc. Check covid testing. Self isolate. NOTIFY IF NO RESOLUTION OF SYMPTOMS OR IF ANY WORSENING OR NEW CONCERNS.       Time Spent:  I spent 6 minutes on this date of service performing the following activities: obtaining history.

## 2021-01-15 NOTE — TELEPHONE ENCOUNTER
Please schedule this patient for Covid 19 testing.  I have updated the patient's demographic information with the patient's contact information for scheduling.    Patient symptoms:Congestion / Runny Nose and Other (specify)ear ache    Provider is: Provider Select: [facility] Provider  (Independent providers with Epic access SHOULD NOT put orders in Epic, results will not route)    Ordering provider (First - Last): PCP  Provider Best Callback # for NSQ:     This patient is a [employer] Employee: YES/NO/NA: No  If yes: notify Employee Exposure line and advise ordering provider that Employee's also need to call that team.    If an [facility] provider, advised order needs to be in Epic.    If an independent provider, they are directed to fax the order to [phone]  (Fax should include: patient name, date of birth, Covid 19 [facility] order, ICD-10 for symptoms, and either [facility] account number or fax number for [facility] to send results)    Independent provider results will arrive via [employer]   [facility] provider results will route via Epic

## 2021-01-15 NOTE — TELEPHONE ENCOUNTER
Pt needs covid testing. If we have tests available at our office this is preferred. Otherwise contact covid communication center please.   
covid test ordered and command center notified  
Detail Level: Detailed
Other Procedure: poss punch bx

## 2021-01-17 LAB — SARS-COV-2 RNA RESP QL NAA+PROBE: NOT DETECTED

## 2021-01-18 ENCOUNTER — TELEPHONE (OUTPATIENT)
Dept: FAMILY MEDICINE | Facility: CLINIC | Age: 56
End: 2021-01-18

## 2021-03-08 ENCOUNTER — TRANSCRIBE ORDERS (OUTPATIENT)
Dept: SCHEDULING | Age: 56
End: 2021-03-08

## 2021-03-08 DIAGNOSIS — K76.1 CHRONIC PASSIVE CONGESTION OF LIVER: Primary | ICD-10-CM

## 2021-03-08 DIAGNOSIS — R94.5 ABNORMAL RESULTS OF LIVER FUNCTION STUDIES: ICD-10-CM

## 2021-03-11 ENCOUNTER — APPOINTMENT (RX ONLY)
Dept: URBAN - METROPOLITAN AREA CLINIC 28 | Facility: CLINIC | Age: 56
Setting detail: DERMATOLOGY
End: 2021-03-11

## 2021-03-11 DIAGNOSIS — D485 NEOPLASM OF UNCERTAIN BEHAVIOR OF SKIN: ICD-10-CM

## 2021-03-11 PROBLEM — D48.5 NEOPLASM OF UNCERTAIN BEHAVIOR OF SKIN: Status: ACTIVE | Noted: 2021-03-11

## 2021-03-11 PROCEDURE — ? BIOPSY BY SHAVE METHOD

## 2021-03-11 PROCEDURE — 11102 TANGNTL BX SKIN SINGLE LES: CPT

## 2021-03-11 PROCEDURE — 11103 TANGNTL BX SKIN EA SEP/ADDL: CPT

## 2021-03-11 ASSESSMENT — LOCATION SIMPLE DESCRIPTION DERM
LOCATION SIMPLE: RIGHT THIGH
LOCATION SIMPLE: LEFT THIGH

## 2021-03-11 ASSESSMENT — LOCATION DETAILED DESCRIPTION DERM
LOCATION DETAILED: LEFT ANTERIOR DISTAL THIGH
LOCATION DETAILED: RIGHT ANTERIOR PROXIMAL THIGH

## 2021-03-11 ASSESSMENT — LOCATION ZONE DERM: LOCATION ZONE: LEG

## 2021-03-17 ENCOUNTER — HOSPITAL ENCOUNTER (OUTPATIENT)
Dept: RADIOLOGY | Facility: HOSPITAL | Age: 56
Discharge: HOME | End: 2021-03-17
Attending: INTERNAL MEDICINE
Payer: COMMERCIAL

## 2021-03-17 DIAGNOSIS — K76.1 CHRONIC PASSIVE CONGESTION OF LIVER: ICD-10-CM

## 2021-03-17 DIAGNOSIS — R94.5 ABNORMAL RESULTS OF LIVER FUNCTION STUDIES: ICD-10-CM

## 2021-04-06 ENCOUNTER — HOSPITAL ENCOUNTER (OUTPATIENT)
Dept: RADIOLOGY | Facility: HOSPITAL | Age: 56
Discharge: HOME | End: 2021-04-06
Attending: INTERNAL MEDICINE
Payer: COMMERCIAL

## 2021-04-06 PROCEDURE — 74183 MRI ABD W/O CNTR FLWD CNTR: CPT

## 2021-04-06 PROCEDURE — A9585 GADOBUTROL INJECTION: HCPCS | Mod: JW | Performed by: INTERNAL MEDICINE

## 2021-04-06 RX ORDER — GADOBUTROL 604.72 MG/ML
0.1 INJECTION INTRAVENOUS
Status: COMPLETED | OUTPATIENT
Start: 2021-04-06 | End: 2021-04-06

## 2021-04-06 RX ADMIN — GADOBUTROL 6 MMOL: 604.72 INJECTION INTRAVENOUS at 10:15

## 2021-04-08 ENCOUNTER — APPOINTMENT (RX ONLY)
Dept: URBAN - METROPOLITAN AREA CLINIC 28 | Facility: CLINIC | Age: 56
Setting detail: DERMATOLOGY
End: 2021-04-08

## 2021-04-08 DIAGNOSIS — D22 MELANOCYTIC NEVI: ICD-10-CM

## 2021-04-08 DIAGNOSIS — L81.4 OTHER MELANIN HYPERPIGMENTATION: ICD-10-CM

## 2021-04-08 PROBLEM — D22.71 MELANOCYTIC NEVI OF RIGHT LOWER LIMB, INCLUDING HIP: Status: ACTIVE | Noted: 2021-04-08

## 2021-04-08 PROCEDURE — ? OBSERVATION

## 2021-04-08 PROCEDURE — ? COUNSELING

## 2021-04-08 PROCEDURE — ? PATHOLOGY DISCUSSION

## 2021-04-08 PROCEDURE — 99213 OFFICE O/P EST LOW 20 MIN: CPT

## 2021-04-08 ASSESSMENT — LOCATION ZONE DERM
LOCATION ZONE: LEG
LOCATION ZONE: TRUNK
LOCATION ZONE: ARM

## 2021-04-08 ASSESSMENT — LOCATION SIMPLE DESCRIPTION DERM
LOCATION SIMPLE: RIGHT THIGH
LOCATION SIMPLE: RIGHT UPPER BACK
LOCATION SIMPLE: CHEST
LOCATION SIMPLE: RIGHT UPPER ARM
LOCATION SIMPLE: LEFT FOREARM

## 2021-04-08 ASSESSMENT — LOCATION DETAILED DESCRIPTION DERM
LOCATION DETAILED: LEFT VENTRAL LATERAL PROXIMAL FOREARM
LOCATION DETAILED: RIGHT ANTERIOR PROXIMAL UPPER ARM
LOCATION DETAILED: RIGHT SUPERIOR MEDIAL UPPER BACK
LOCATION DETAILED: RIGHT ANTERIOR PROXIMAL THIGH
LOCATION DETAILED: MIDDLE STERNUM

## 2021-06-08 ENCOUNTER — TRANSCRIBE ORDERS (OUTPATIENT)
Dept: SCHEDULING | Age: 56
End: 2021-06-08

## 2021-06-08 DIAGNOSIS — Z11.59 ENCOUNTER FOR SCREENING FOR OTHER VIRAL DISEASES: Primary | ICD-10-CM

## 2021-06-08 DIAGNOSIS — Z12.31 ENCOUNTER FOR SCREENING MAMMOGRAM FOR MALIGNANT NEOPLASM OF BREAST: Primary | ICD-10-CM

## 2021-07-21 ENCOUNTER — HOSPITAL ENCOUNTER (OUTPATIENT)
Dept: RADIOLOGY | Facility: HOSPITAL | Age: 56
Discharge: HOME | End: 2021-07-21
Payer: COMMERCIAL

## 2021-07-21 ENCOUNTER — TELEPHONE (OUTPATIENT)
Dept: FAMILY MEDICINE | Facility: CLINIC | Age: 56
End: 2021-07-21

## 2021-07-21 DIAGNOSIS — Z12.31 ENCOUNTER FOR SCREENING MAMMOGRAM FOR MALIGNANT NEOPLASM OF BREAST: ICD-10-CM

## 2021-07-21 PROCEDURE — 77063 BREAST TOMOSYNTHESIS BI: CPT

## 2021-08-01 NOTE — TELEPHONE ENCOUNTER
Recent minute clinic note seen. Looks like heme/onc note for another patient Greg Pepe was attached to this. Can this be placed in correct chart please.

## 2021-08-11 ENCOUNTER — TELEPHONE (OUTPATIENT)
Dept: SCHEDULING | Facility: CLINIC | Age: 56
End: 2021-08-11

## 2021-08-11 NOTE — TELEPHONE ENCOUNTER
patient injured herself with a service tray and is having rib pain...she'd like a prescription for an X-ray    [phone]

## 2021-08-12 ENCOUNTER — APPOINTMENT (OUTPATIENT)
Dept: RADIOLOGY | Age: 56
End: 2021-08-12
Attending: NURSE PRACTITIONER
Payer: COMMERCIAL

## 2021-08-12 ENCOUNTER — HOSPITAL ENCOUNTER (OUTPATIENT)
Facility: CLINIC | Age: 56
Discharge: HOME | End: 2021-08-12
Attending: EMERGENCY MEDICINE
Payer: COMMERCIAL

## 2021-08-12 VITALS
DIASTOLIC BLOOD PRESSURE: 59 MMHG | TEMPERATURE: 98.3 F | SYSTOLIC BLOOD PRESSURE: 124 MMHG | HEART RATE: 68 BPM | OXYGEN SATURATION: 98 %

## 2021-08-12 DIAGNOSIS — R07.81 RIB PAIN ON LEFT SIDE: Primary | ICD-10-CM

## 2021-08-12 PROCEDURE — S9083 URGENT CARE CENTER GLOBAL: HCPCS | Performed by: NURSE PRACTITIONER

## 2021-08-12 PROCEDURE — 99203 OFFICE O/P NEW LOW 30 MIN: CPT | Performed by: NURSE PRACTITIONER

## 2021-08-12 PROCEDURE — 71101 X-RAY EXAM UNILAT RIBS/CHEST: CPT | Mod: LT | Performed by: NURSE PRACTITIONER

## 2021-08-12 RX ORDER — HYDROGEN PEROXIDE 3 %
1 SOLUTION, NON-ORAL MISCELLANEOUS ONCE
Status: DISCONTINUED | OUTPATIENT
Start: 2021-08-12 | End: 2021-08-12

## 2021-08-12 ASSESSMENT — ENCOUNTER SYMPTOMS
HEADACHES: 0
LIGHT-HEADEDNESS: 0
FEVER: 0
WOUND: 0
NECK PAIN: 0
COUGH: 0
SINUS PRESSURE: 0
RHINORRHEA: 0
ADENOPATHY: 0
NAUSEA: 0
SINUS PAIN: 0
CHILLS: 0
SHORTNESS OF BREATH: 0
DIZZINESS: 0
SORE THROAT: 0
VOMITING: 0
WEAKNESS: 0
DIARRHEA: 0
NECK STIFFNESS: 0
ARTHRALGIAS: 0
MYALGIAS: 1
BACK PAIN: 0
NUMBNESS: 0
ABDOMINAL PAIN: 0

## 2021-08-12 NOTE — DISCHARGE INSTRUCTIONS
Apply ice painful area for 15-20 minutes at least 2-3 times a day to help with swelling and pain  Take Ibuprofen 600 mg (with food) every 6-8 hours or Tylenol every 6 hours as needed for pain.    See your primary care provider symptoms do not resolve in 3-4 weeks.    Thank you for choosing [facility]!

## 2021-08-12 NOTE — ED ATTESTATION NOTE
The patient was evaluated and managed by the physician assistant / nurse practitioner. I was available for any questions       Harmony Guillen, DO  08/12/21 3993

## 2021-09-13 ENCOUNTER — TELEPHONE (OUTPATIENT)
Dept: FAMILY MEDICINE | Facility: CLINIC | Age: 56
End: 2021-09-13

## 2021-09-14 ENCOUNTER — CLINICAL SUPPORT (OUTPATIENT)
Dept: FAMILY MEDICINE | Facility: CLINIC | Age: 56
End: 2021-09-14
Payer: COMMERCIAL

## 2021-09-14 DIAGNOSIS — Z11.1 VISIT FOR TB SKIN TEST: Primary | ICD-10-CM

## 2021-09-14 LAB
INDURATION: 0 MM
TB SKIN TEST: NEGATIVE

## 2021-09-14 PROCEDURE — 86580 TB INTRADERMAL TEST: CPT | Performed by: INTERNAL MEDICINE

## 2021-09-20 ENCOUNTER — APPOINTMENT (OUTPATIENT)
Dept: LAB | Facility: HOSPITAL | Age: 56
End: 2021-09-20
Attending: INTERNAL MEDICINE
Payer: COMMERCIAL

## 2021-09-20 DIAGNOSIS — Z11.59 ENCOUNTER FOR SCREENING FOR OTHER VIRAL DISEASES: ICD-10-CM

## 2021-09-20 LAB — SARS-COV-2 RNA RESP QL NAA+PROBE: NEGATIVE

## 2021-09-20 PROCEDURE — U0003 INFECTIOUS AGENT DETECTION BY NUCLEIC ACID (DNA OR RNA); SEVERE ACUTE RESPIRATORY SYNDROME CORONAVIRUS 2 (SARS-COV-2) (CORONAVIRUS DISEASE [COVID-19]), AMPLIFIED PROBE TECHNIQUE, MAKING USE OF HIGH THROUGHPUT TECHNOLOGIES AS DESCRIBED BY CMS-2020-01-R: HCPCS

## 2021-09-22 ENCOUNTER — ANESTHESIA EVENT (OUTPATIENT)
Dept: ENDOSCOPY | Facility: HOSPITAL | Age: 56
End: 2021-09-22
Payer: COMMERCIAL

## 2021-09-22 NOTE — ANESTHESIA PREPROCEDURE EVALUATION
Relevant Problems   No relevant active problems       Anesthesia ROS/MED HX    Anesthesia History - neg  Pulmonary - neg  Neuro/Psych - neg  Cardiovascular- neg   Echocardiogram reviewed and Covid19 Test Reviewed  Hematological - neg  GI/Hepatic- neg  Musculoskeletal- neg  Renal Disease- neg  Endo/Other- neg  Body Habitus: Normal       Past Surgical History:   Procedure Laterality Date   • LIVER BIOPSY     • TONSILLECTOMY     • WISDOM TOOTH EXTRACTION  1981       Physical Exam    Airway   Mallampati: I   TM distance: >3 FB   Neck ROM: full  Cardiovascular - normal   Rhythm: regular   Rate: normalPulmonary - normal   clear to auscultation  Dental - normal        Anesthesia Plan    Plan: general    Technique: total IV anesthesia     Lines and Monitors: PIV     Airway: natural airway / supplemental oxygen   ASA 1  Anesthetic plan and risks discussed with: patient  Induction:    intravenous   Postop Plan:   Patient Disposition: phase II then home

## 2021-09-23 ENCOUNTER — ANESTHESIA (OUTPATIENT)
Dept: ENDOSCOPY | Facility: HOSPITAL | Age: 56
End: 2021-09-23
Payer: COMMERCIAL

## 2021-09-23 ENCOUNTER — HOSPITAL ENCOUNTER (OUTPATIENT)
Facility: HOSPITAL | Age: 56
Discharge: HOME | End: 2021-09-23
Attending: INTERNAL MEDICINE | Admitting: INTERNAL MEDICINE
Payer: COMMERCIAL

## 2021-09-23 VITALS
HEIGHT: 64 IN | SYSTOLIC BLOOD PRESSURE: 94 MMHG | TEMPERATURE: 97.2 F | WEIGHT: 115 LBS | OXYGEN SATURATION: 100 % | BODY MASS INDEX: 19.63 KG/M2 | DIASTOLIC BLOOD PRESSURE: 62 MMHG | HEART RATE: 65 BPM | RESPIRATION RATE: 29 BRPM

## 2021-09-23 DIAGNOSIS — Z86.0100 HISTORY OF COLON POLYPS: ICD-10-CM

## 2021-09-23 PROCEDURE — 0DBL8ZX EXCISION OF TRANSVERSE COLON, VIA NATURAL OR ARTIFICIAL OPENING ENDOSCOPIC, DIAGNOSTIC: ICD-10-PCS | Performed by: INTERNAL MEDICINE

## 2021-09-23 PROCEDURE — 25800000 HC PHARMACY IV SOLUTIONS: Performed by: NURSE ANESTHETIST, CERTIFIED REGISTERED

## 2021-09-23 PROCEDURE — 71000011 HC PACU PHASE 1 EA ADDL MIN: Performed by: INTERNAL MEDICINE

## 2021-09-23 PROCEDURE — 75000020 HC COLONSCOPY SNARE: Performed by: INTERNAL MEDICINE

## 2021-09-23 PROCEDURE — 71000001 HC PACU PHASE 1 INITIAL 30MIN: Performed by: INTERNAL MEDICINE

## 2021-09-23 PROCEDURE — 27200000 HC STERILE SUPPLY: Performed by: INTERNAL MEDICINE

## 2021-09-23 PROCEDURE — 25000000 HC PHARMACY GENERAL: Performed by: NURSE ANESTHETIST, CERTIFIED REGISTERED

## 2021-09-23 PROCEDURE — 0DBN8ZX EXCISION OF SIGMOID COLON, VIA NATURAL OR ARTIFICIAL OPENING ENDOSCOPIC, DIAGNOSTIC: ICD-10-PCS | Performed by: INTERNAL MEDICINE

## 2021-09-23 PROCEDURE — 37000001 HC ANESTHESIA GENERAL: Performed by: INTERNAL MEDICINE

## 2021-09-23 PROCEDURE — 63600000 HC DRUGS/DETAIL CODE: Performed by: NURSE ANESTHETIST, CERTIFIED REGISTERED

## 2021-09-23 PROCEDURE — 88305 TISSUE EXAM BY PATHOLOGIST: CPT | Performed by: INTERNAL MEDICINE

## 2021-09-23 RX ORDER — LIDOCAINE HYDROCHLORIDE 10 MG/ML
INJECTION, SOLUTION INFILTRATION; PERINEURAL AS NEEDED
Status: DISCONTINUED | OUTPATIENT
Start: 2021-09-23 | End: 2021-09-23 | Stop reason: SURG

## 2021-09-23 RX ORDER — DEXTROSE 40 %
15-30 GEL (GRAM) ORAL AS NEEDED
Status: CANCELLED | OUTPATIENT
Start: 2021-09-23

## 2021-09-23 RX ORDER — GLYCOPYRROLATE 0.6MG/3ML
SYRINGE (ML) INTRAVENOUS AS NEEDED
Status: DISCONTINUED | OUTPATIENT
Start: 2021-09-23 | End: 2021-09-23 | Stop reason: SURG

## 2021-09-23 RX ORDER — DEXTROSE 50 % IN WATER (D50W) INTRAVENOUS SYRINGE
25 AS NEEDED
Status: CANCELLED | OUTPATIENT
Start: 2021-09-23

## 2021-09-23 RX ORDER — SODIUM CHLORIDE 9 MG/ML
INJECTION, SOLUTION INTRAVENOUS CONTINUOUS PRN
Status: DISCONTINUED | OUTPATIENT
Start: 2021-09-23 | End: 2021-09-23 | Stop reason: SURG

## 2021-09-23 RX ORDER — PROPOFOL 10 MG/ML
INJECTION, EMULSION INTRAVENOUS AS NEEDED
Status: DISCONTINUED | OUTPATIENT
Start: 2021-09-23 | End: 2021-09-23 | Stop reason: SURG

## 2021-09-23 RX ORDER — ONDANSETRON HYDROCHLORIDE 2 MG/ML
INJECTION, SOLUTION INTRAVENOUS AS NEEDED
Status: DISCONTINUED | OUTPATIENT
Start: 2021-09-23 | End: 2021-09-23 | Stop reason: SURG

## 2021-09-23 RX ORDER — IBUPROFEN 200 MG
16-32 TABLET ORAL AS NEEDED
Status: CANCELLED | OUTPATIENT
Start: 2021-09-23

## 2021-09-23 RX ADMIN — PROPOFOL INJECTABLE EMULSION 150 MCG/KG/MIN: 10 INJECTION, EMULSION INTRAVENOUS at 09:34

## 2021-09-23 RX ADMIN — GLYCOPYRROLATE 0.1 MG: 0.2 INJECTION, SOLUTION INTRAMUSCULAR; INTRAVITREAL at 09:29

## 2021-09-23 RX ADMIN — PROPOFOL INJECTABLE EMULSION 40 MG: 10 INJECTION, EMULSION INTRAVENOUS at 09:33

## 2021-09-23 RX ADMIN — SODIUM CHLORIDE: 9 INJECTION, SOLUTION INTRAVENOUS at 09:31

## 2021-09-23 RX ADMIN — LIDOCAINE HYDROCHLORIDE 5 ML: 10 INJECTION, SOLUTION INFILTRATION; PERINEURAL at 09:33

## 2021-09-23 RX ADMIN — ONDANSETRON HYDROCHLORIDE 4 MG: 2 SOLUTION INTRAMUSCULAR; INTRAVENOUS at 09:35

## 2021-09-23 NOTE — ANESTHESIA POSTPROCEDURE EVALUATION
Patient: Mary Brandon    Procedure Summary     Date: 09/23/21 Room / Location: [facility] 4 (D) / [facility]    Anesthesia Start: 0931 Anesthesia Stop: 1007    Procedure: MT COLSC FLX W/RMVL OF TUMOR POLYP LESION SNARE TQ [20454 (CPT®)] (N/A Anus) Diagnosis:       History of colon polyps      (History of Polyps)    Providers: Alex Joseph MD Responsible Provider: Philippe Wooten MD    Anesthesia Type: general ASA Status: 1          Anesthesia Type: general  PACU Vitals  9/23/2021 1002 - 9/23/2021 1048      9/23/2021  1007 9/23/2021  1015 9/23/2021  1030       BP: 93/45 94/50 94/62     Temp: 36.2 °C (97.2 °F) -- --     Pulse: 67 69 65     Resp: 16 32 29     SpO2: 100 % 100 % 100 %             Anesthesia Post Evaluation    Pain management: adequate  Patient location during evaluation: PACU  Patient participation: complete - patient participated  Level of consciousness: awake and alert  Cardiovascular status: acceptable  Airway Patency: adequate  Respiratory status: acceptable  Hydration status: acceptable  Anesthetic complications: no

## 2021-09-23 NOTE — OP NOTE
_______________________________________________________________________________  Patient Name: Mary Brandon            Procedure Date: 9/23/2021 9:02 AM  MRN: [id number]                     Account Number: [id number]  YOB: 1965              Age: 56  Gender: Female                        Note Status: Finalized  Attending MD: MANNIE MARTINES MD~CONRAD  _______________________________________________________________________________  Procedure:             Colonoscopy  Indications:           Surveillance: Personal history of colonic polyps  (unknown histology) on last colonoscopy more than 5  years ago, Last colonoscopy: 2013  Providers:             MANNIE MARTINES MD~CONRAD (Doctor)  Referring MD:          MARK SALAZAR MD~MARTIN  Requesting Provider:  Medicines:             See the Anesthesia note for documentation of the  administered medications  Complications:         No immediate complications.  _______________________________________________________________________________  Procedure:             After I obtained informed consent, the scope was  passed under direct vision. Throughout the procedure,  the patient's blood pressure, pulse, and oxygen  saturations were monitored continuously. The  colonoscope was introduced through the anus and  advanced to the terminal ileum, with identification of  the appendiceal orifice and IC valve. The colonoscopy  was performed without difficulty. The patient  tolerated the procedure well. The terminal ileum,  ileocecal valve, appendiceal orifice, and rectum were  photographed. The quality of the bowel preparation was  excellent and adequate to identify polyps 6 mm and  larger in size.  Findings:  The digital rectal exam findings include non-thrombosed external  hemorrhoids. Pertinent negatives include normal sphincter tone and no  palpable rectal lesions.  The terminal ileum appeared normal.  A 3 mm polyp was found in the transverse colon. The polyp was  sessile.  The polyp was removed with a cold biopsy forceps. Resection and  retrieval were complete. For hemostasis, one hemostatic clip was  successfully placed (MR conditional). There was no bleeding at the end  of the procedure. Estimated blood loss was minimal.  A 4 mm polyp was found in the distal sigmoid colon. The polyp was  sessile. The polyp was removed with a cold snare. Resection and  retrieval were complete. Estimated blood loss was minimal.  Internal hemorrhoids were found during retroflexion. The hemorrhoids  were Grade I (internal hemorrhoids that do not prolapse).  Retroflexion in the right colon was performed.  The exam was otherwise without abnormality on direct and retroflexion  views.  Impression:            - The examined portion of the ileum was normal.  - One 3 mm polyp in the transverse colon, removed with  a cold biopsy forceps. Resected and retrieved. Clip  (MR conditional) was placed. No bleeding at completion  of intervention.  - One 4 mm polyp in the distal sigmoid colon, removed  with a cold snare. Resected and retrieved.  - Internal and external hemorrhoids.  - The examination was otherwise normal on direct and  retroflexion views within the right colon and rectum.  Recommendation:        - Await pathology results.  - Repeat colonoscopy in 5-10 years for surveillance  based on pathology results.  - If the pathology report reveals sessile serrated  adenomatous tissue, then repeat the colonoscopy for  surveillance in 5 years.  - If the pathology report reveals tubular adenomatous  tissue, then repeat the colonoscopy for surveillance  in 7 years.  - If the pathology report indicates hyperplastic  polyp, then repeat colonoscopy for surveillance in 10  years.  - Continue present medications.  - Resume previous diet.  - Return to GI office in 3 months with Dr. Keenan, as  previously recommended, with pre-visit labwork.  - The findings and recommendations were discussed with  the patient.  -  Patient has a contact number available for  emergencies. The signs and symptoms of potential  delayed complications were discussed with the patient.  Return to normal activities tomorrow. Written  discharge instructions were provided to the patient.  Procedure Code(s):     --- Professional ---  68886, 57, Colonoscopy, flexible; with control of  bleeding, any method  68152, Colonoscopy, flexible; with removal of  tumor(s), polyp(s), or other lesion(s) by snare  technique  Diagnosis Code(s):     --- Professional ---  Z86.010, Personal history of colonic polyps  K64.0, First degree hemorrhoids  K64.4, Residual hemorrhoidal skin tags  K63.5, Polyp of colon  CPT copyright 2020 [employer]. All rights reserved.  The codes documented in this report are preliminary and upon  review may  be revised to meet current compliance requirements.  Attending Participation:  I personally performed the entire procedure.  __________________________  MANNIE MARTINES MD~CONRAD  9/23/2021 10:11:45 AM  This report has been signed electronically.  Number of Addenda: 0  Note Initiated On: 9/23/2021 9:02 AM

## 2021-09-23 NOTE — ANESTHESIOLOGIST PRE-PROCEDURE ATTESTATION
Pre-Procedure Patient Identification:  I am the Primary Anesthesiologist and have identified the patient on 09/23/21 at 9:19 AM.   I have confirmed the procedure(s) will be performed by the following surgeon/proceduralist Alex Joseph MD.

## 2021-09-27 LAB
CASE RPRT: NORMAL
CLINICAL INFO: NORMAL
PATH REPORT.FINAL DX SPEC: NORMAL
PATH REPORT.FINAL DX SPEC: NORMAL
PATH REPORT.GROSS SPEC: NORMAL

## 2021-10-20 ENCOUNTER — OFFICE VISIT (OUTPATIENT)
Dept: FAMILY MEDICINE | Facility: CLINIC | Age: 56
End: 2021-10-20
Payer: COMMERCIAL

## 2021-10-20 VITALS
SYSTOLIC BLOOD PRESSURE: 110 MMHG | WEIGHT: 121 LBS | TEMPERATURE: 98.9 F | DIASTOLIC BLOOD PRESSURE: 60 MMHG | OXYGEN SATURATION: 99 % | HEIGHT: 63 IN | RESPIRATION RATE: 15 BRPM | BODY MASS INDEX: 21.44 KG/M2 | HEART RATE: 72 BPM

## 2021-10-20 DIAGNOSIS — R79.89 ELEVATED LIVER FUNCTION TESTS: ICD-10-CM

## 2021-10-20 DIAGNOSIS — R76.8 ANA POSITIVE: ICD-10-CM

## 2021-10-20 DIAGNOSIS — Z00.00 ENCOUNTER FOR GENERAL MEDICAL EXAMINATION: Primary | ICD-10-CM

## 2021-10-20 DIAGNOSIS — M81.0 AGE-RELATED OSTEOPOROSIS WITHOUT CURRENT PATHOLOGICAL FRACTURE: ICD-10-CM

## 2021-10-20 DIAGNOSIS — Z83.79 FAMILY HISTORY OF CELIAC DISEASE: ICD-10-CM

## 2021-10-20 PROCEDURE — 3008F BODY MASS INDEX DOCD: CPT | Performed by: INTERNAL MEDICINE

## 2021-10-20 PROCEDURE — 99396 PREV VISIT EST AGE 40-64: CPT | Performed by: INTERNAL MEDICINE

## 2021-10-20 ASSESSMENT — ENCOUNTER SYMPTOMS
CHOKING: 0
DIAPHORESIS: 0
ANAL BLEEDING: 0
EYE ITCHING: 0
ACTIVITY CHANGE: 0
DIARRHEA: 0
CHILLS: 0
PHOTOPHOBIA: 0
STRIDOR: 0
SPEECH DIFFICULTY: 0
BLOOD IN STOOL: 0
SEIZURES: 0
FACIAL ASYMMETRY: 0
EYE REDNESS: 0
APPETITE CHANGE: 0
POLYDIPSIA: 0
SHORTNESS OF BREATH: 0
DYSURIA: 0
FLANK PAIN: 0
EYE DISCHARGE: 0
LIGHT-HEADEDNESS: 0
NAUSEA: 0
DIFFICULTY URINATING: 0
HEMATURIA: 0
ABDOMINAL PAIN: 0
CONSTIPATION: 0
WHEEZING: 0
PSYCHIATRIC NEGATIVE: 1
UNEXPECTED WEIGHT CHANGE: 0
NUMBNESS: 0
RECTAL PAIN: 0
APNEA: 0
DIZZINESS: 0
FREQUENCY: 0
MUSCULOSKELETAL NEGATIVE: 1
FATIGUE: 0
PALPITATIONS: 0
FEVER: 0
ABDOMINAL DISTENTION: 0
POLYPHAGIA: 0
COUGH: 0
HEADACHES: 0
BRUISES/BLEEDS EASILY: 0
WEAKNESS: 0
VOMITING: 0
ADENOPATHY: 0
TREMORS: 0
CHEST TIGHTNESS: 0
EYE PAIN: 0

## 2021-10-20 NOTE — PATIENT INSTRUCTIONS
Patient Education     Health Maintenance, Female  Adopting a healthy lifestyle and getting preventive care are important in promoting health and wellness. Ask your health care provider about:  · The right schedule for you to have regular tests and exams.  · Things you can do on your own to prevent diseases and keep yourself healthy.  What should I know about diet, weight, and exercise?  Eat a healthy diet    · Eat a diet that includes plenty of vegetables, fruits, low-fat dairy products, and lean protein.  · Do not eat a lot of foods that are high in solid fats, added sugars, or sodium.  Maintain a healthy weight  Body mass index (BMI) is used to identify weight problems. It estimates body fat based on height and weight. Your health care provider can help determine your BMI and help you achieve or maintain a healthy weight.  Get regular exercise  Get regular exercise. This is one of the most important things you can do for your health. Most adults should:  · Exercise for at least 150 minutes each week. The exercise should increase your heart rate and make you sweat (moderate-intensity exercise).  · Do strengthening exercises at least twice a week. This is in addition to the moderate-intensity exercise.  · Spend less time sitting. Even light physical activity can be beneficial.  Watch cholesterol and blood lipids  Have your blood tested for lipids and cholesterol at 20 years of age, then have this test every 5 years.  Have your cholesterol levels checked more often if:  · Your lipid or cholesterol levels are high.  · You are older than 40 years of age.  · You are at high risk for heart disease.  What should I know about cancer screening?  Depending on your health history and family history, you may need to have cancer screening at various ages. This may include screening for:  · Breast cancer.  · Cervical cancer.  · Colorectal cancer.  · Skin cancer.  · Lung cancer.  What should I know about heart disease,  diabetes, and high blood pressure?  Blood pressure and heart disease  · High blood pressure causes heart disease and increases the risk of stroke. This is more likely to develop in people who have high blood pressure readings, are of  descent, or are overweight.  · Have your blood pressure checked:  ? Every 3-5 years if you are 18-39 years of age.  ? Every year if you are 40 years old or older.  Diabetes  Have regular diabetes screenings. This checks your fasting blood sugar level. Have the screening done:  · Once every three years after age 40 if you are at a normal weight and have a low risk for diabetes.  · More often and at a younger age if you are overweight or have a high risk for diabetes.  What should I know about preventing infection?  Hepatitis B  If you have a higher risk for hepatitis B, you should be screened for this virus. Talk with your health care provider to find out if you are at risk for hepatitis B infection.  Hepatitis C  Testing is recommended for:  · Everyone born from 1945 through 1965.  · Anyone with known risk factors for hepatitis C.  Sexually transmitted infections (STIs)  · Get screened for STIs, including gonorrhea and chlamydia, if:  ? You are sexually active and are younger than 24 years of age.  ? You are older than 24 years of age and your health care provider tells you that you are at risk for this type of infection.  ? Your sexual activity has changed since you were last screened, and you are at increased risk for chlamydia or gonorrhea. Ask your health care provider if you are at risk.  · Ask your health care provider about whether you are at high risk for HIV. Your health care provider may recommend a prescription medicine to help prevent HIV infection. If you choose to take medicine to prevent HIV, you should first get tested for HIV. You should then be tested every 3 months for as long as you are taking the medicine.  Pregnancy  · If you are about to stop having your  period (premenopausal) and you may become pregnant, seek counseling before you get pregnant.  · Take 400 to 800 micrograms (mcg) of folic acid every day if you become pregnant.  · Ask for birth control (contraception) if you want to prevent pregnancy.  Osteoporosis and menopause  Osteoporosis is a disease in which the bones lose minerals and strength with aging. This can result in bone fractures. If you are 65 years old or older, or if you are at risk for osteoporosis and fractures, ask your health care provider if you should:  · Be screened for bone loss.  · Take a calcium or vitamin D supplement to lower your risk of fractures.  · Be given hormone replacement therapy (HRT) to treat symptoms of menopause.  Follow these instructions at home:  Lifestyle  · Do not use any products that contain nicotine or tobacco, such as cigarettes, e-cigarettes, and chewing tobacco. If you need help quitting, ask your health care provider.  · Do not use street drugs.  · Do not share needles.  · Ask your health care provider for help if you need support or information about quitting drugs.  Alcohol use  · Do not drink alcohol if:  ? Your health care provider tells you not to drink.  ? You are pregnant, may be pregnant, or are planning to become pregnant.  · If you drink alcohol:  ? Limit how much you use to 0-1 drink a day.  ? Limit intake if you are breastfeeding.  · Be aware of how much alcohol is in your drink. In the [place] one drink equals one 12 oz bottle of beer (355 mL), one 5 oz glass of wine (148 mL), or one 1½ oz glass of hard liquor (44 mL).  General instructions  · Schedule regular health, dental, and eye exams.  · Stay current with your vaccines.  · Tell your health care provider if:  ? You often feel depressed.  ? You have ever been abused or do not feel safe at home.  Summary  · Adopting a healthy lifestyle and getting preventive care are important in promoting health and wellness.  · Follow your health care provider's  instructions about healthy diet, exercising, and getting tested or screened for diseases.  · Follow your health care provider's instructions on monitoring your cholesterol and blood pressure.  This information is not intended to replace advice given to you by your health care provider. Make sure you discuss any questions you have with your health care provider.  Document Revised: 12/11/2019 Document Reviewed: 12/11/2019  "[employer] Patient Education © 2021 [employer]

## 2021-10-20 NOTE — PROGRESS NOTES
Subjective      Patient ID: Mary Brandon is a 56 y.o. female.  1965      HPI     55 yo female gen med exam.   -f/b hepatology/GI for elevated liver function. Had MRI imaging in spring 2021.   -FHx celiac, reports neg testing herself in past. Has been a while since tested. No sig GI c/o voiced. No diarrhea/constipation, melena, rectal bleeding, bloating, abd pain, n/v.   -OP-states did not tolerate fosamax due to nausea. Rib fx about 5 yrs ago. Denies frequent falls.   -no CP, dyspnea, LH, edema.   -no fever/chills or unexplained wt changes.       The following have been reviewed and updated as appropriate in this visit:  Tobacco  Allergies  Meds  Surg Hx  Fam Hx  Soc Hx      Review of Systems   Constitutional: Negative for activity change, appetite change, chills, diaphoresis, fatigue, fever and unexpected weight change.   HENT: Negative.    Eyes: Negative for photophobia, pain, discharge, redness, itching and visual disturbance.   Respiratory: Negative for apnea, cough, choking, chest tightness, shortness of breath, wheezing and stridor.    Cardiovascular: Negative for chest pain, palpitations and leg swelling.   Gastrointestinal: Negative for abdominal distention, abdominal pain, anal bleeding, blood in stool, constipation, diarrhea, nausea, rectal pain and vomiting.   Endocrine: Negative for cold intolerance, heat intolerance, polydipsia, polyphagia and polyuria.   Genitourinary: Negative for decreased urine volume, difficulty urinating, dysuria, enuresis, flank pain, frequency, genital sores, hematuria and urgency.   Musculoskeletal: Negative.    Skin: Negative for rash.   Neurological: Negative for dizziness, tremors, seizures, syncope, facial asymmetry, speech difficulty, weakness, light-headedness, numbness and headaches.   Hematological: Negative for adenopathy. Does not bruise/bleed easily.   Psychiatric/Behavioral: Negative.        Objective     Vitals:    10/20/21 1030   BP: 110/60   BP  "Location: Left upper arm   Patient Position: Sitting   Pulse: 72   Resp: 15   Temp: 37.2 °C (98.9 °F)   SpO2: 99%   Weight: 54.9 kg (121 lb)   Height: 1.6 m (5' 3\")     Body mass index is 21.43 kg/m².    Physical Exam  Vitals reviewed.   Constitutional:       General: She is not in acute distress.     Appearance: She is well-developed. She is not diaphoretic.   Eyes:      General: No scleral icterus.        Right eye: No discharge.         Left eye: No discharge.   Neck:      Vascular: No carotid bruit.   Cardiovascular:      Rate and Rhythm: Normal rate and regular rhythm.      Pulses: Normal pulses.      Heart sounds: Normal heart sounds. No murmur heard.  No friction rub. No gallop.    Pulmonary:      Effort: Pulmonary effort is normal. No respiratory distress.      Breath sounds: Normal breath sounds. No stridor. No wheezing, rhonchi or rales.   Chest:      Chest wall: No tenderness.   Abdominal:      General: Bowel sounds are normal. There is no distension.      Palpations: Abdomen is soft. There is no mass.      Tenderness: There is no abdominal tenderness. There is no right CVA tenderness, left CVA tenderness, guarding or rebound.   Musculoskeletal:      Cervical back: No rigidity or tenderness.      Right lower leg: No edema.      Left lower leg: No edema.   Lymphadenopathy:      Cervical: No cervical adenopathy.   Skin:     General: Skin is warm and dry.   Neurological:      Mental Status: She is alert and oriented to person, place, and time.   Psychiatric:         Mood and Affect: Mood normal.         Behavior: Behavior normal.         Assessment/Plan   Diagnoses and all orders for this visit:    Encounter for general medical examination (Primary)  -heart healthy diet/regular exercise.   -check fasting labs as below.   -scheduled for flu vaccine. S/p covid vaccination. rec shingrix-pt declines at this time.   -mammo due 7/2022.   -states utd with annual gyn exam.   -c-scope done 9/2021-f/u for screening 10 " yrs per GI.   -f/b derm for skin ca screening.   -f/b ophtho.   -     Lipid panel; Future  -     Comprehensive metabolic panel; Future  -     CBC and differential; Future  -     Hemoglobin A1c; Future  -     TSH w reflex FT4; Future  -     Vitamin D 25 hydroxy; Future  -     Urinalysis with microscopic; Future    Elevated liver function tests  -previous w/u, bx, MRI, etc noted. Continue GI/hepatology f/u. Has appt scheduled in 1/2021. Update LFTs. New sx notify me. Echo without concerning findings.   -     Comprehensive metabolic panel; Future    NAEL positive  -no new sx. Pt states rheum did not feel further f/u with them was needed. Notify with any new sx.     Age-related osteoporosis without current pathological fracture  -wt bearing exercises. Ca/vit D. Intolerant bisphosphonate. Highly encouraged fu with rheum to discuss alternative tx options and need for further eval.   -     Ambulatory referral to Rheumatology; Future  -     Vitamin D 25 hydroxy; Future    Family history of celiac disease  -     Tissue transglutaminase, IgA; Future  -     IgA; Future

## 2021-11-04 LAB
25(OH)D3+25(OH)D2 SERPL-MCNC: 63.4 NG/ML (ref 30–100)
ALBUMIN SERPL-MCNC: 4.3 G/DL (ref 3.8–4.9)
ALBUMIN/GLOB SERPL: 1.8 {RATIO} (ref 1.2–2.2)
ALP SERPL-CCNC: 140 IU/L (ref 44–121)
ALT SERPL-CCNC: 48 IU/L (ref 0–32)
AST SERPL-CCNC: 42 IU/L (ref 0–40)
BASOPHILS # BLD AUTO: 0.1 X10E3/UL (ref 0–0.2)
BASOPHILS NFR BLD AUTO: 1 %
BILIRUB SERPL-MCNC: 0.6 MG/DL (ref 0–1.2)
BUN SERPL-MCNC: 10 MG/DL (ref 6–24)
BUN/CREAT SERPL: 18 (ref 9–23)
CALCIUM SERPL-MCNC: 9.7 MG/DL (ref 8.7–10.2)
CHLORIDE SERPL-SCNC: 101 MMOL/L (ref 96–106)
CHOLEST SERPL-MCNC: 199 MG/DL (ref 100–199)
CO2 SERPL-SCNC: 26 MMOL/L (ref 20–29)
CREAT SERPL-MCNC: 0.57 MG/DL (ref 0.57–1)
EOSINOPHIL # BLD AUTO: 0.1 X10E3/UL (ref 0–0.4)
EOSINOPHIL NFR BLD AUTO: 2 %
ERYTHROCYTE [DISTWIDTH] IN BLOOD BY AUTOMATED COUNT: 11.9 % (ref 11.7–15.4)
GLOBULIN SER CALC-MCNC: 2.4 G/DL (ref 1.5–4.5)
GLUCOSE SERPL-MCNC: 86 MG/DL (ref 65–99)
HBA1C MFR BLD: 5.2 % (ref 4.8–5.6)
HCT VFR BLD AUTO: 35.9 % (ref 34–46.6)
HDLC SERPL-MCNC: 75 MG/DL
HGB BLD-MCNC: 12.5 G/DL (ref 11.1–15.9)
IGA SERPL-MCNC: 275 MG/DL (ref 87–352)
IMM GRANULOCYTES # BLD AUTO: 0 X10E3/UL (ref 0–0.1)
IMM GRANULOCYTES NFR BLD AUTO: 0 %
LAB CORP EGFR IF AFRICN AM: 118 ML/MIN/1.73
LAB CORP EGFR IF NONAFRICN AM: 103 ML/MIN/1.73
LDLC SERPL CALC-MCNC: 113 MG/DL (ref 0–99)
LYMPHOCYTES # BLD AUTO: 1.4 X10E3/UL (ref 0.7–3.1)
LYMPHOCYTES NFR BLD AUTO: 22 %
MCH RBC QN AUTO: 31.3 PG (ref 26.6–33)
MCHC RBC AUTO-ENTMCNC: 34.8 G/DL (ref 31.5–35.7)
MCV RBC AUTO: 90 FL (ref 79–97)
MONOCYTES # BLD AUTO: 0.4 X10E3/UL (ref 0.1–0.9)
MONOCYTES NFR BLD AUTO: 7 %
NEUTROPHILS # BLD AUTO: 4.2 X10E3/UL (ref 1.4–7)
NEUTROPHILS NFR BLD AUTO: 68 %
PLATELET # BLD AUTO: 315 X10E3/UL (ref 150–450)
POTASSIUM SERPL-SCNC: 4.8 MMOL/L (ref 3.5–5.2)
PROT SERPL-MCNC: 6.7 G/DL (ref 6–8.5)
RBC # BLD AUTO: 3.99 X10E6/UL (ref 3.77–5.28)
SODIUM SERPL-SCNC: 139 MMOL/L (ref 134–144)
T4 FREE SERPL-MCNC: 1.1 NG/DL (ref 0.82–1.77)
TRIGL SERPL-MCNC: 59 MG/DL (ref 0–149)
TSH SERPL DL<=0.005 MIU/L-ACNC: 3.84 UIU/ML (ref 0.45–4.5)
TTG IGA SER-ACNC: <2 U/ML (ref 0–3)
VLDLC SERPL CALC-MCNC: 11 MG/DL (ref 5–40)
WBC # BLD AUTO: 6.2 X10E3/UL (ref 3.4–10.8)

## 2021-11-05 LAB
APPEARANCE UR: CLEAR
BACTERIA #/AREA URNS HPF: NORMAL /[HPF]
BILIRUB UR QL STRIP: NEGATIVE
CASTS URNS QL MICRO: NORMAL /LPF
COLOR UR: YELLOW
EPI CELLS #/AREA URNS HPF: NORMAL /HPF (ref 0–10)
GLUCOSE UR QL: NEGATIVE
HGB UR QL STRIP: NEGATIVE
KETONES UR QL STRIP: NEGATIVE
LEUKOCYTE ESTERASE UR QL STRIP: NEGATIVE
MICRO URNS: NORMAL
MICRO URNS: NORMAL
NITRITE UR QL STRIP: NEGATIVE
PH UR STRIP: 7.5 [PH] (ref 5–7.5)
PROT UR QL STRIP: NEGATIVE
RBC #/AREA URNS HPF: NORMAL /HPF (ref 0–2)
SP GR UR: 1 (ref 1–1.03)
UROBILINOGEN UR STRIP-MCNC: 0.2 MG/DL (ref 0.2–1)
WBC #/AREA URNS HPF: NORMAL /HPF (ref 0–5)

## 2021-11-07 ENCOUNTER — TELEPHONE (OUTPATIENT)
Dept: FAMILY MEDICINE | Facility: CLINIC | Age: 56
End: 2021-11-07

## 2021-12-06 ENCOUNTER — OFFICE VISIT (OUTPATIENT)
Dept: OBSTETRICS AND GYNECOLOGY | Facility: CLINIC | Age: 56
End: 2021-12-06
Payer: COMMERCIAL

## 2021-12-06 VITALS
DIASTOLIC BLOOD PRESSURE: 60 MMHG | BODY MASS INDEX: 20.14 KG/M2 | HEIGHT: 64 IN | SYSTOLIC BLOOD PRESSURE: 120 MMHG | WEIGHT: 118 LBS

## 2021-12-06 DIAGNOSIS — Z01.419 ENCOUNTER FOR GYNECOLOGICAL EXAMINATION WITHOUT ABNORMAL FINDING: Primary | ICD-10-CM

## 2021-12-06 DIAGNOSIS — Z12.31 ENCOUNTER FOR SCREENING MAMMOGRAM FOR MALIGNANT NEOPLASM OF BREAST: ICD-10-CM

## 2021-12-06 DIAGNOSIS — Z13.820 SCREENING FOR OSTEOPOROSIS: ICD-10-CM

## 2021-12-06 DIAGNOSIS — N89.8 VAGINAL DRYNESS: ICD-10-CM

## 2021-12-06 PROCEDURE — 3008F BODY MASS INDEX DOCD: CPT | Performed by: OBSTETRICS & GYNECOLOGY

## 2021-12-06 PROCEDURE — 99396 PREV VISIT EST AGE 40-64: CPT | Performed by: OBSTETRICS & GYNECOLOGY

## 2021-12-06 RX ORDER — ESTRADIOL 0.1 MG/G
CREAM VAGINAL
Qty: 42.5 G | Refills: 0 | Status: SHIPPED | OUTPATIENT
Start: 2021-12-06 | End: 2022-08-03 | Stop reason: ALTCHOICE

## 2021-12-06 RX ORDER — ESTRADIOL 0.1 MG/G
CREAM VAGINAL
Qty: 42.5 G | Refills: 6 | Status: SHIPPED | OUTPATIENT
Start: 2021-12-06 | End: 2022-08-03 | Stop reason: ALTCHOICE

## 2021-12-06 NOTE — PROGRESS NOTES
Visit Date: 12/06/2021  Mary Brandon is 56 y.o. female presenting today for Annual GYN Exam      HPI:  No LMP recorded. Patient is postmenopausal.  Menstrual Cycle: Patient's last menstrual period was approximately 8 years ago. She is not experiencing any vaginal bleeding, spotting, or pink discharge.  Sexually Activity: Patient is sexually active with difficulty. Some dryness, uses lubricant.   Bowel and Bladder function: Normal per patient.  Pap Smears: does not have a history of abnormal pap smears, Last pap was in 11/20 and was neg/neg.   Patient is not experiencing hot flashes.  Patient is not experiencing night sweats.  Patient is experiencing vaginal dryness.  Patient  does not report any breast issues.  Patient's Last Mammogram was in 7/21 and was  Bi-rads 1  Patient's Last Dexa Scan was in 8/20 and was  Osteoporosis. She is not on any medications for bone loss prevention. She is taking Calcium and Vitamin D supplementation. PCP had her on a medication that she did not tolerate well. PCP suggested consult with rheumatology.   Patient does have a family history of breast or gyn cancers.  Patient  does not desire screening for STIs today.   Patient does not have a history of Domestic Violence/Verbal Abuse/Sexual Assault. She does feel safe in her current environment.     Past Medical History:  has a past medical history of Breast cancer screening by mammogram (07/21/2021), Cervical cancer screening (11/30/2020), Chicken pox, H/O bone density study (08/2020), and Osteoporosis.    Past Surgical History:  has a past surgical history that includes Tonsillectomy; Liver biopsy; Holmen tooth extraction (1981); and Colonoscopy (09/2021).    Medications:   Current Outpatient Medications:   •  ascorbic acid, vitamin C, (vitamin C) 1,000 mg tablet, Take 500 mg by mouth daily., Disp: , Rfl:   •  cetirizine (ZyrTEC) 10 mg tablet, Take 10 mg by mouth as needed for allergies., Disp: , Rfl:   •  cholecalciferol, vitamin D3,  "(VITAMIN D3 ORAL), Take 2,000 mg by mouth daily., Disp: , Rfl:   •  multivitamin tablet, Take 1 tablet by mouth daily., Disp: , Rfl:       Allergies: has No Known Allergies.     Family History: family history includes BRCA1 Negative in her biological sister; BRCA2 Negative in her biological sister; Heart disease in her biological father; Hypertension in her biological mother; Ovarian cancer in her biological sister; Stroke in her biological father.    Social History:  reports that she has never smoked. She has never used smokeless tobacco. She reports that she does not drink alcohol and does not use drugs.    Review of Systems  Constitutional:   No hot flashes.   No night sweats.   No unexpected weight changes.  HENT:   No oral ulcers.   No headaches related to menstrual cycle.   Breasts:   No changes to skin of the breast or nipple.   No nipple discharge.   No breast lumps/cysts.   No breast pain.   Patient has not noticed any changes to breasts.   Respiratory:   No shortness of breath.  Cardiovascular:   No chest pain  Gastrointestinal:   No changes in bowel habits.  Genitourinary:   No pain with urination.   Yes urgency with urination.   Yes increased frequency of urination.   No urinary incontinence.   Yes vaginal dryness.  No vaginal discharge.   No painful intercourse.   No genital sores.   No irregular menstrual cycles.   No heavy menstrual cycles.   No painful menstrual periods.   No bleeding between menstrual cycles.   No bleeding after intercourse.  No absence of menstrual periods.  Integument:   No changes to any existing genital skin lesions or moles.   No new vaginal skin lesions or moles.   No genital rashes.   Endocrine:   No increased hair growth   Psychiatric:   Yes anxiety.   Yes depression.   No suicidal ideation.  Patient does not have concerns for her safety.   Heme-Lymph:   No easy bruising.   No unexplained lumps.           Visit Vitals  /60   Ht 1.613 m (5' 3.5\")   Wt 53.5 kg (118 lb) "   BMI 20.57 kg/m²       OBGyn Exam  General Appearance: Alert, cooperative, no acute distress  Head: Normocephalic, without obvious abnormality  Breast: Right breast normal without mass, skin or nipple changes or axillary nodes. Left breast normal without mass, skin or nipple changes or axillary nodes.  Abdomen: Soft, nontender, nondistended,no masses, no organomegaly  Pelvic exam: VULVA: normal appearing vulva with no masses, tenderness or lesions. VAGINA: normal appearing vagina with normal color and discharge, no lesions. CERVIX: normal appearing cervix without discharge or lesions. UTERUS: uterus is normal size, shape, consistency and non-tender. ADNEXA: normal adnexa in size, nontender and no masses.  Extremities: no edema    Office Labs/Tests:       Assessment and Plan:  56 y.o. yo presents for an annual exam.   1. Pap  not collected today.  2. STI Testing: GC/CT declined. HIV/SA/Hep B declined.  3. Mammogram slip  provided today.  4. Dexa slip  provided today.   6. Return to office 1 year or prn  7. Patient to call for results in 7-10 days.   8. Vaginal Atrophy: Patient reports symptoms of vaginal atrophy and dyspareunia. Exam consistent with this. Reviewed etiology of vaginal atrophy and likelihood this is caused by reduced estrogen due to menopause. Options for treatment including vaginal lubricants, vaginal moisturizers, vaginal estrogen creams, and estring all discussed. Patient Desires treatment for this as it is impacting her quality of life. Follow up:Script for vaginal estrogen therapy given. Patient will do nightly therapy for 14 days and then space to twice weekly.          Noemí Gamez MD

## 2021-12-17 ENCOUNTER — TELEPHONE (OUTPATIENT)
Dept: FAMILY MEDICINE | Facility: CLINIC | Age: 56
End: 2021-12-17
Payer: COMMERCIAL

## 2021-12-17 NOTE — TELEPHONE ENCOUNTER
Yes we are still encouraging patients to get the available flu vaccine. It is providing at least some degree of defense against influenza.

## 2021-12-17 NOTE — TELEPHONE ENCOUNTER
Pt called and said she was watching CNN last night and they said that the flu vaccine this year is really not working for the strain that is out there.  She is scheduled to get it today at her pharmacy and would like your opinion on if she should get it or not.

## 2022-03-10 NOTE — TELEPHONE ENCOUNTER
Patient said she has a stuffy nose and is having pain in her right ear. She said she also feels like she has swollen glands. No telemedicines please advise.       Phone # [phone]   None

## 2022-07-22 ENCOUNTER — HOSPITAL ENCOUNTER (OUTPATIENT)
Dept: RADIOLOGY | Facility: HOSPITAL | Age: 57
Discharge: HOME | End: 2022-07-22
Attending: OBSTETRICS & GYNECOLOGY
Payer: COMMERCIAL

## 2022-07-22 DIAGNOSIS — Z12.31 ENCOUNTER FOR SCREENING MAMMOGRAM FOR MALIGNANT NEOPLASM OF BREAST: ICD-10-CM

## 2022-07-22 PROCEDURE — 77063 BREAST TOMOSYNTHESIS BI: CPT

## 2022-07-22 PROCEDURE — 77067 SCR MAMMO BI INCL CAD: CPT

## 2022-07-29 ENCOUNTER — TELEPHONE (OUTPATIENT)
Dept: FAMILY MEDICINE | Facility: CLINIC | Age: 57
End: 2022-07-29
Payer: COMMERCIAL

## 2022-07-29 NOTE — TELEPHONE ENCOUNTER
Pt stated she has a lot of pressure in her forehead and had a headache for a week.Pt has no other sx, pt believes it could be a sinus infection but wanted Dr. Goldstein thoughts on her sx first be asking for a prescription. Pt tested herself twice with a home test and both negative. Please advise

## 2022-08-02 NOTE — TELEPHONE ENCOUNTER
Spoke with pt and she states she's feeling better. Has slight headache and pressure. Pt scheduled for telemed tomorrow but will cancel if she feels she does not need it.

## 2022-08-03 ENCOUNTER — TELEMEDICINE (OUTPATIENT)
Dept: FAMILY MEDICINE | Facility: CLINIC | Age: 57
End: 2022-08-03
Payer: COMMERCIAL

## 2022-08-03 DIAGNOSIS — J01.90 ACUTE SINUSITIS, RECURRENCE NOT SPECIFIED, UNSPECIFIED LOCATION: Primary | ICD-10-CM

## 2022-08-03 PROCEDURE — 99213 OFFICE O/P EST LOW 20 MIN: CPT | Mod: GT | Performed by: INTERNAL MEDICINE

## 2022-08-03 RX ORDER — AMOXICILLIN AND CLAVULANATE POTASSIUM 875; 125 MG/1; MG/1
1 TABLET, FILM COATED ORAL 2 TIMES DAILY
Qty: 14 TABLET | Refills: 0 | Status: SHIPPED | OUTPATIENT
Start: 2022-08-03 | End: 2022-08-10

## 2022-08-03 RX ORDER — FLUTICASONE PROPIONATE 50 MCG
2 SPRAY, SUSPENSION (ML) NASAL DAILY
Qty: 16 G | Refills: 0 | Status: SHIPPED | OUTPATIENT
Start: 2022-08-03 | End: 2022-10-21 | Stop reason: ALTCHOICE

## 2022-08-03 ASSESSMENT — ENCOUNTER SYMPTOMS
DYSURIA: 0
WHEEZING: 0
CHOKING: 0
SEIZURES: 0
COUGH: 0
SPEECH DIFFICULTY: 0
ABDOMINAL PAIN: 0
BLOOD IN STOOL: 0
EYE REDNESS: 0
APPETITE CHANGE: 0
EYE ITCHING: 0
ANAL BLEEDING: 0
DIAPHORESIS: 0
EYE DISCHARGE: 0
CHILLS: 0
TREMORS: 0
POLYDIPSIA: 0
UNEXPECTED WEIGHT CHANGE: 0
NAUSEA: 0
FATIGUE: 0
DIFFICULTY URINATING: 0
CHEST TIGHTNESS: 0
EYE PAIN: 0
FACIAL ASYMMETRY: 0
DIZZINESS: 0
FEVER: 0
SHORTNESS OF BREATH: 0
PALPITATIONS: 0
POLYPHAGIA: 0
FACIAL SWELLING: 0
DIARRHEA: 0
PHOTOPHOBIA: 0
HEMATURIA: 0
RECTAL PAIN: 0
FREQUENCY: 0
APNEA: 0
VOMITING: 0
STRIDOR: 0
ACTIVITY CHANGE: 0
ABDOMINAL DISTENTION: 0
CONSTIPATION: 0
FLANK PAIN: 0

## 2022-08-03 NOTE — PROGRESS NOTES
Verification of Patient Location:  The patient affirms they are currently located in the following state: [place]    Request for Consent:    Audio and Video Encounter   Hello, my name is Jaspreet Goldstein MD.  Before we proceed, can you please verify your identification by telling me your full name and date of birth?  Can you tell me who is in the room with you?    You and I are about to have a telemedicine check-in or visit because you have requested it.  This is a live video-conference.  I am a real person, speaking to you in real time.  There is no one else with me on the video-conference.  However, when we use ([employer], [employer], etc) it is important for you to know that the video-conference may not be secure or private.  I am not recording this conversation and I am asking you not to record it.  This telemedicine visit will be billed to your health insurance or you, if you are self-insured.  You understand you will be responsible for any copayments or coinsurances that apply to your telemedicine visit.  Communication platform used for this encounter:  [employer] Video Visit (with Zoom integration)     Before starting our telemedicine visit, I am required to get your consent for this virtual check-in or visit by telemedicine. Do you consent?      Patient Response to Request for Consent:  Yes      Visit Documentation:  Subjective     Patient ID: Mary Brandon is a 57 y.o. female.  1965      HPI     58 yo female sick visit.   -had covid test PCR and rapid all negative.   -notes about 10 days of nasal congestion, facial pressure and HA. No fever. No cough.   -using zyrtec and saline NS.   -no myalgias, n/v/d or sob. No CP or wheezing.   -no sig mucopurulent nasal d/c.   -no vision changes.     The following have been reviewed and updated as appropriate in this visit:   Tobacco  Allergies  Meds  Problems  Med Hx  Surg Hx  Fam Hx         Review of Systems   Constitutional: Negative for activity change,  appetite change, chills, diaphoresis, fatigue, fever and unexpected weight change.   HENT: Negative for dental problem, drooling, ear discharge, ear pain, facial swelling, hearing loss, mouth sores and nosebleeds.    Eyes: Negative for photophobia, pain, discharge, redness, itching and visual disturbance.   Respiratory: Negative for apnea, cough, choking, chest tightness, shortness of breath, wheezing and stridor.    Cardiovascular: Negative for chest pain, palpitations and leg swelling.   Gastrointestinal: Negative for abdominal distention, abdominal pain, anal bleeding, blood in stool, constipation, diarrhea, nausea, rectal pain and vomiting.   Endocrine: Negative for cold intolerance, heat intolerance, polydipsia, polyphagia and polyuria.   Genitourinary: Negative for decreased urine volume, difficulty urinating, dysuria, enuresis, flank pain, frequency, genital sores, hematuria and urgency.   Neurological: Negative for dizziness, tremors, seizures, syncope, facial asymmetry and speech difficulty.         Assessment/Plan   1. Sinusitis-rec add flonase NS. Cont zyrtec, saline NS. Tylenol prn. If sx persist will take course of augmentin which was e-prescribed. SE profile discussed. Neg covid testing. NOTIFY IF NO RESOLUTION OF SYMPTOMS OR IF ANY WORSENING OR NEW CONCERNS.     Time Spent:  I spent 10 minutes on this date of service performing the following activities: providing counseling and education.

## 2022-08-22 ENCOUNTER — HOSPITAL ENCOUNTER (OUTPATIENT)
Dept: RADIOLOGY | Facility: HOSPITAL | Age: 57
Discharge: HOME | End: 2022-08-22
Attending: OBSTETRICS & GYNECOLOGY
Payer: COMMERCIAL

## 2022-08-22 DIAGNOSIS — Z13.820 SCREENING FOR OSTEOPOROSIS: ICD-10-CM

## 2022-08-22 PROCEDURE — 77080 DXA BONE DENSITY AXIAL: CPT

## 2022-10-21 ENCOUNTER — OFFICE VISIT (OUTPATIENT)
Dept: FAMILY MEDICINE | Facility: CLINIC | Age: 57
End: 2022-10-21
Payer: COMMERCIAL

## 2022-10-21 VITALS
TEMPERATURE: 99 F | BODY MASS INDEX: 21.86 KG/M2 | SYSTOLIC BLOOD PRESSURE: 112 MMHG | WEIGHT: 123.4 LBS | HEART RATE: 78 BPM | DIASTOLIC BLOOD PRESSURE: 64 MMHG | HEIGHT: 63 IN

## 2022-10-21 DIAGNOSIS — R79.89 ELEVATED LIVER FUNCTION TESTS: ICD-10-CM

## 2022-10-21 DIAGNOSIS — Z00.00 ENCOUNTER FOR GENERAL MEDICAL EXAMINATION: Primary | ICD-10-CM

## 2022-10-21 DIAGNOSIS — M81.0 AGE-RELATED OSTEOPOROSIS WITHOUT CURRENT PATHOLOGICAL FRACTURE: ICD-10-CM

## 2022-10-21 DIAGNOSIS — R76.8 ANA POSITIVE: ICD-10-CM

## 2022-10-21 PROCEDURE — 90471 IMMUNIZATION ADMIN: CPT | Performed by: INTERNAL MEDICINE

## 2022-10-21 PROCEDURE — 90686 IIV4 VACC NO PRSV 0.5 ML IM: CPT | Performed by: INTERNAL MEDICINE

## 2022-10-21 PROCEDURE — 99396 PREV VISIT EST AGE 40-64: CPT | Mod: 25 | Performed by: INTERNAL MEDICINE

## 2022-10-21 PROCEDURE — 3008F BODY MASS INDEX DOCD: CPT | Performed by: INTERNAL MEDICINE

## 2022-10-21 ASSESSMENT — ENCOUNTER SYMPTOMS
FLANK PAIN: 0
TROUBLE SWALLOWING: 0
FACIAL ASYMMETRY: 0
SINUS PRESSURE: 0
PSYCHIATRIC NEGATIVE: 1
HEMATURIA: 0
DIZZINESS: 0
SHORTNESS OF BREATH: 0
WEAKNESS: 0
POLYDIPSIA: 0
SORE THROAT: 0
NECK STIFFNESS: 0
NECK PAIN: 0
HEADACHES: 0
FEVER: 0
BACK PAIN: 0
BRUISES/BLEEDS EASILY: 0
APPETITE CHANGE: 0
FREQUENCY: 0
BLOOD IN STOOL: 0
ABDOMINAL DISTENTION: 0
COUGH: 0
VOMITING: 0
UNEXPECTED WEIGHT CHANGE: 0
ANAL BLEEDING: 0
VOICE CHANGE: 0
SPEECH DIFFICULTY: 0
ADENOPATHY: 0
RHINORRHEA: 0
NAUSEA: 0
PALPITATIONS: 0
PHOTOPHOBIA: 0
APNEA: 0
FATIGUE: 0
DYSURIA: 0
JOINT SWELLING: 0
TREMORS: 0
FACIAL SWELLING: 0
EYE ITCHING: 0
CHILLS: 0
WHEEZING: 0
ABDOMINAL PAIN: 0
EYE REDNESS: 0
CHOKING: 0
LIGHT-HEADEDNESS: 0
SINUS PAIN: 0
EYE DISCHARGE: 0
DIFFICULTY URINATING: 0
EYE PAIN: 0
STRIDOR: 0
ACTIVITY CHANGE: 0
RECTAL PAIN: 0
DIARRHEA: 0
DIAPHORESIS: 0
POLYPHAGIA: 0
NUMBNESS: 0
SEIZURES: 0
CONSTIPATION: 0
CHEST TIGHTNESS: 0

## 2022-10-21 ASSESSMENT — PATIENT HEALTH QUESTIONNAIRE - PHQ9: SUM OF ALL RESPONSES TO PHQ9 QUESTIONS 1 & 2: 0

## 2022-10-21 NOTE — PATIENT INSTRUCTIONS
Schedule appointment with dermatology. Consider Dr. Vesna Silva or Dr. Bishop Doshi, #[phone] or Dr. Helen Peoples or Dr. Radha Macdonald, #[phone].

## 2022-10-21 NOTE — PROGRESS NOTES
Jaspreet Goldstein M.D.   [facility]  [address], [address]  [place] [address]  [phone]     Reason for visit:   Chief Complaint   Patient presents with    Annual Exam      HPI   Mary Brandon is a 57 y.o. female who presents for gen med exam.   -f/b Dr. Keenan-GI/hepatology for elevated liver enzymes. Trying ursodiol.   -f/b rheum for OP- plans to try reclast. On hold for now given ongoing dental work.   -had bivalent covid booster.   -utd with gyn-Dr. Gamez.   -active in daily life. Some room to increase aerobic exercise.   -no frequent falls. No fractures.   -L foot pain-dropped  on her foot last night. This seems to be improving. Able to wt bear without problem currently.   -denies CP, dyspnea, LH, edema, orthopnea, PND, syncope, palpitations.   -no n/v or Bm changes. No urinary c/o. No vag bleeding. No fever/chills or sweats.   -no sig mood concerns.       Past Medical History:   Diagnosis Date    Breast cancer screening by mammogram 07/21/2021    BIRADS CATEGORY 1 - NEGATIVE (NOR NC D) HETEROGENEOUS    Cervical cancer screening 11/30/2020    Neg/ HPV neg    Chicken pox     H/O bone density study 08/2020    Osteoporosis      Past Surgical History:   Procedure Laterality Date    COLONOSCOPY  09/2021    LIVER BIOPSY      TONSILLECTOMY      WISDOM TOOTH EXTRACTION  1981     Social History     Social History Narrative    Not on file     Family History   Problem Relation Age of Onset    Hypertension Biological Mother     Stroke Biological Father         92 yrs    Heart disease Biological Father         CHF    Ovarian cancer Biological Sister     BRCA1 Negative Biological Sister     BRCA2 Negative Biological Sister     Leukemia Other     Breast cancer Neg Hx     Colon cancer Neg Hx     Diabetes Neg Hx     Uterine cancer Neg Hx     Cervical cancer Neg Hx      Patient has no known allergies.  Current Outpatient Medications   Medication Sig Dispense Refill     ascorbic acid, vitamin C, (VITAMIN C) 1,000 mg tablet Take 500 mg by mouth daily.      cetirizine (ZyrTEC) 10 mg tablet Take 10 mg by mouth as needed for allergies.      cholecalciferol, vitamin D3, (VITAMIN D3 ORAL) Take 2,000 mg by mouth daily.      multivitamin tablet Take 1 tablet by mouth daily.      URSODIOL ORAL Take by mouth.       No current facility-administered medications for this visit.       Review of Systems   Constitutional: Negative for activity change, appetite change, chills, diaphoresis, fatigue, fever and unexpected weight change.   HENT: Negative for congestion, dental problem, drooling, ear discharge, ear pain, facial swelling, hearing loss, mouth sores, nosebleeds, postnasal drip, rhinorrhea, sinus pressure, sinus pain, sneezing, sore throat, tinnitus, trouble swallowing and voice change.    Eyes: Negative for photophobia, pain, discharge, redness, itching and visual disturbance.   Respiratory: Negative for apnea, cough, choking, chest tightness, shortness of breath, wheezing and stridor.    Cardiovascular: Negative for chest pain, palpitations and leg swelling.   Gastrointestinal: Negative for abdominal distention, abdominal pain, anal bleeding, blood in stool, constipation, diarrhea, nausea, rectal pain and vomiting.   Endocrine: Negative for cold intolerance, heat intolerance, polydipsia, polyphagia and polyuria.   Genitourinary: Negative for difficulty urinating, dysuria, flank pain, frequency, hematuria, pelvic pain, urgency, vaginal discharge and vaginal pain.   Musculoskeletal: Negative for back pain, gait problem, joint swelling, neck pain and neck stiffness.   Skin: Negative for rash.   Allergic/Immunologic: Negative for immunocompromised state.   Neurological: Negative for dizziness, tremors, seizures, syncope, facial asymmetry, speech difficulty, weakness, light-headedness, numbness and headaches.   Hematological: Negative for adenopathy. Does not bruise/bleed easily.  "  Psychiatric/Behavioral: Negative.        Objective   Vitals:    10/21/22 0902   BP: 112/64   BP Location: Left upper arm   Patient Position: Sitting   Pulse: 78   Temp: 37.2 °C (99 °F)   TempSrc: Oral   Weight: 56 kg (123 lb 6.4 oz)   Height: 1.6 m (5' 3\")     Body mass index is 21.86 kg/m².    Physical Exam  Vitals reviewed.   Constitutional:       General: She is not in acute distress.     Appearance: She is well-developed and well-nourished. She is not diaphoretic.   HENT:      Head: Normocephalic and atraumatic.      Right Ear: Tympanic membrane, ear canal and external ear normal. There is no impacted cerumen.      Left Ear: Tympanic membrane, ear canal and external ear normal. There is no impacted cerumen.      Nose: Nose normal.      Mouth/Throat:      Mouth: Oropharynx is clear and moist.      Pharynx: No oropharyngeal exudate.   Eyes:      General: No scleral icterus.        Right eye: No discharge.         Left eye: No discharge.      Extraocular Movements: EOM normal.      Conjunctiva/sclera: Conjunctivae normal.   Neck:      Thyroid: No thyromegaly.      Vascular: No carotid bruit or JVD.      Trachea: No tracheal deviation.   Cardiovascular:      Rate and Rhythm: Normal rate and regular rhythm.      Pulses: Intact distal pulses.      Heart sounds: Normal heart sounds. No murmur heard.    No friction rub. No gallop.   Pulmonary:      Effort: Pulmonary effort is normal. No respiratory distress.      Breath sounds: Normal breath sounds. No stridor. No wheezing, rhonchi or rales.   Chest:      Chest wall: No tenderness.   Abdominal:      General: Bowel sounds are normal. There is no distension.      Palpations: Abdomen is soft. There is no mass.      Tenderness: There is no abdominal tenderness. There is no right CVA tenderness, left CVA tenderness, guarding or rebound.      Hernia: No hernia is present.   Musculoskeletal:         General: No edema.      Cervical back: Normal range of motion and neck " supple. No rigidity or tenderness.      Right lower leg: No edema.      Left lower leg: No edema.      Comments: L foot-no swelling, erythema, bruising or sig tenderness to palpation.    Lymphadenopathy:      Cervical: No cervical adenopathy.   Skin:     General: Skin is warm and dry.      Findings: No erythema or rash.   Neurological:      Mental Status: She is alert and oriented to person, place, and time.      Cranial Nerves: No cranial nerve deficit.      Coordination: Coordination normal.   Psychiatric:         Mood and Affect: Mood and affect and mood normal.         Behavior: Behavior normal.                 Assessment   Problem List Items Addressed This Visit        Hematologic    NAEL positive  -f/b rheum-Dr. Mitchell.        Other    Elevated liver function tests  -cont GI f/u. On ursodiol with monitoring now.      Other Visit Diagnoses     Encounter for general medical examination    -  Primary  -heart healthy diet, increase regular exercise.   -LFTS per hepatology.   -update labs as below.   -f/u with derm for skin ca screening rec.   -mammo due 7/2023.   -c-scope screening utd.   -flu shot today. utd with bivalent covid booster. Cont to rec shingrix. rec boostrix.     Relevant Orders    Lipid panel    Basic metabolic panel    Hemoglobin A1c    CBC and differential    TSH    Vitamin D 25 hydroxy    Age-related osteoporosis without current pathological fracture      -cont rheum f/u. Plans for reclast.     Relevant Orders    Vitamin D 25 hydroxy              Jaspreet Goldstein MD  10/21/2022

## 2022-10-27 ENCOUNTER — TELEPHONE (OUTPATIENT)
Dept: FAMILY MEDICINE | Facility: CLINIC | Age: 57
End: 2022-10-27
Payer: COMMERCIAL

## 2022-10-27 LAB
25(OH)D3+25(OH)D2 SERPL-MCNC: 63.3 NG/ML (ref 30–100)
BASOPHILS # BLD AUTO: 0.1 X10E3/UL (ref 0–0.2)
BASOPHILS NFR BLD AUTO: 1 %
BUN SERPL-MCNC: 11 MG/DL (ref 6–24)
BUN/CREAT SERPL: 19 (ref 9–23)
CALCIUM SERPL-MCNC: 9.5 MG/DL (ref 8.7–10.2)
CHLORIDE SERPL-SCNC: 103 MMOL/L (ref 96–106)
CHOLEST SERPL-MCNC: 185 MG/DL (ref 100–199)
CO2 SERPL-SCNC: 25 MMOL/L (ref 20–29)
CREAT SERPL-MCNC: 0.57 MG/DL (ref 0.57–1)
EGFRCR SERPLBLD CKD-EPI 2021: 105 ML/MIN/1.73
EOSINOPHIL # BLD AUTO: 0.1 X10E3/UL (ref 0–0.4)
EOSINOPHIL NFR BLD AUTO: 2 %
ERYTHROCYTE [DISTWIDTH] IN BLOOD BY AUTOMATED COUNT: 11.8 % (ref 11.7–15.4)
GLUCOSE SERPL-MCNC: 75 MG/DL (ref 70–99)
HBA1C MFR BLD: 5 % (ref 4.8–5.6)
HCT VFR BLD AUTO: 36.6 % (ref 34–46.6)
HDLC SERPL-MCNC: 77 MG/DL
HGB BLD-MCNC: 12.5 G/DL (ref 11.1–15.9)
IMM GRANULOCYTES # BLD AUTO: 0 X10E3/UL (ref 0–0.1)
IMM GRANULOCYTES NFR BLD AUTO: 0 %
LDLC SERPL CALC-MCNC: 99 MG/DL (ref 0–99)
LYMPHOCYTES # BLD AUTO: 1.5 X10E3/UL (ref 0.7–3.1)
LYMPHOCYTES NFR BLD AUTO: 24 %
MCH RBC QN AUTO: 31.5 PG (ref 26.6–33)
MCHC RBC AUTO-ENTMCNC: 34.2 G/DL (ref 31.5–35.7)
MCV RBC AUTO: 92 FL (ref 79–97)
MONOCYTES # BLD AUTO: 0.4 X10E3/UL (ref 0.1–0.9)
MONOCYTES NFR BLD AUTO: 7 %
NEUTROPHILS # BLD AUTO: 4.1 X10E3/UL (ref 1.4–7)
NEUTROPHILS NFR BLD AUTO: 66 %
PLATELET # BLD AUTO: 270 X10E3/UL (ref 150–450)
POTASSIUM SERPL-SCNC: 4.2 MMOL/L (ref 3.5–5.2)
RBC # BLD AUTO: 3.97 X10E6/UL (ref 3.77–5.28)
SODIUM SERPL-SCNC: 142 MMOL/L (ref 134–144)
TRIGL SERPL-MCNC: 47 MG/DL (ref 0–149)
TSH SERPL DL<=0.005 MIU/L-ACNC: 2.59 UIU/ML (ref 0.45–4.5)
VLDLC SERPL CALC-MCNC: 9 MG/DL (ref 5–40)
WBC # BLD AUTO: 6.2 X10E3/UL (ref 3.4–10.8)

## 2022-11-14 NOTE — ED PROVIDER NOTES
Emergency Medicine Note  HPI   HISTORY OF PRESENT ILLNESS     57 y/o female c/o left rib pain. Reports 3 days ago she was carrying a tray on her left side when she bumped into a door and jammed the tray into the left lateral side of her ribs. Has been taking ibuprofen prn for pain. Denies fever, chills, cough, SOB, chest pain, abd pain, N/V/D, or any other injury/pain.            Patient History   PAST HISTORY     Reviewed from Nursing Triage:      Past Medical History:   Diagnosis Date   • Breast cancer screening by mammogram 06/12/2020    BIRADS CATEGORY 1 - NEGATIVE    • Cervical cancer screening 11/30/2020    Neg/ HPV neg   • Chicken pox    • Osteoporosis        Past Surgical History:   Procedure Laterality Date   • LIVER BIOPSY     • TONSILLECTOMY     • WISDOM TOOTH EXTRACTION  1981       Family History   Problem Relation Age of Onset   • Hypertension Biological Mother    • Stroke Biological Father         92 yrs   • Heart disease Biological Father    • Ovarian cancer Biological Sister    • BRCA1 Negative Biological Sister    • BRCA2 Negative Biological Sister    • Breast cancer Neg Hx    • Colon cancer Neg Hx    • Diabetes Neg Hx    • Uterine cancer Neg Hx    • Cervical cancer Neg Hx        Social History     Tobacco Use   • Smoking status: Never Smoker   • Smokeless tobacco: Never Used   Substance Use Topics   • Alcohol use: No   • Drug use: No         Review of Systems   REVIEW OF SYSTEMS     Review of Systems   Constitutional: Negative for chills and fever.   HENT: Negative for congestion, rhinorrhea, sinus pressure, sinus pain and sore throat.    Respiratory: Negative for cough and shortness of breath.    Cardiovascular: Negative for chest pain.   Gastrointestinal: Negative for abdominal pain, diarrhea, nausea and vomiting.   Musculoskeletal: Positive for myalgias (left rib pain). Negative for arthralgias, back pain, neck pain and neck stiffness.   Skin: Negative for rash and wound.   Neurological:  Negative for dizziness, weakness, light-headedness, numbness and headaches.   Hematological: Negative for adenopathy.   All other systems reviewed and are negative.        VITALS     ED Vitals    Date/Time Temp Pulse Resp BP SpO2 [facility]   08/12/21 1128 36.8 °C (98.3 °F) 68 -- 124/59 98 % DB                       Physical Exam   PHYSICAL EXAM     Physical Exam  Vitals reviewed.   Constitutional:       Appearance: She is well-developed. She is not ill-appearing or toxic-appearing.   HENT:      Head: Normocephalic and atraumatic.      Right Ear: External ear normal.      Left Ear: External ear normal.   Cardiovascular:      Rate and Rhythm: Normal rate.   Pulmonary:      Effort: Pulmonary effort is normal.   Chest:      Chest wall: Tenderness (mild left lateral chest wall tenderness) present. No deformity, swelling, crepitus or edema.       Abdominal:      Palpations: Abdomen is soft.      Tenderness: There is no abdominal tenderness.   Musculoskeletal:      Cervical back: Normal range of motion and neck supple.   Skin:     General: Skin is warm and dry.   Neurological:      Mental Status: She is alert and oriented to person, place, and time.   Psychiatric:         Behavior: Behavior is cooperative.         Thought Content: Thought content normal.           PROCEDURES     Procedures     DATA     Results     None              No orders to display       Scoring tools                                 ED Course & MDM   MDM / ED COURSE and CLINICAL IMPRESSIONS     MDM  Number of Diagnoses or Management Options  Rib pain on left side: new and requires workup  Diagnosis management comments: Left rib xrays negative for PTX and rib fx. Lungs clear, 98% on RA, abd soft nontender.  Recommend RICE, tylenol/motrin prn for pain, deep breathing exercises.  F/u with PCP.  Strict instructions given to pt if sx worsen or otherwise concerned to visit local ED.  Pt verbalized understanding and agrees with plan of care.  See dispo for full  plan.         Amount and/or Complexity of Data Reviewed  Tests in the radiology section of CPT®: reviewed    Patient Progress  Patient progress: stable      Clinical Impressions as of Aug 12 1339   Rib pain on left side            Rochelle Pnito CRNP  08/12/21 1213     Ambulatory at [facility]

## 2022-12-12 ENCOUNTER — OFFICE VISIT (OUTPATIENT)
Dept: OBSTETRICS AND GYNECOLOGY | Facility: CLINIC | Age: 57
End: 2022-12-12
Payer: COMMERCIAL

## 2022-12-12 VITALS
HEIGHT: 63 IN | DIASTOLIC BLOOD PRESSURE: 70 MMHG | SYSTOLIC BLOOD PRESSURE: 120 MMHG | WEIGHT: 121.8 LBS | BODY MASS INDEX: 21.58 KG/M2

## 2022-12-12 DIAGNOSIS — Z01.419 ENCOUNTER FOR GYNECOLOGICAL EXAMINATION WITHOUT ABNORMAL FINDING: Primary | ICD-10-CM

## 2022-12-12 PROCEDURE — 3008F BODY MASS INDEX DOCD: CPT | Performed by: OBSTETRICS & GYNECOLOGY

## 2022-12-12 PROCEDURE — 99396 PREV VISIT EST AGE 40-64: CPT | Performed by: OBSTETRICS & GYNECOLOGY

## 2022-12-12 NOTE — PROGRESS NOTES
Visit Date: 12/12/2022  Mary Brandon is 57 y.o. female presenting today for Annual GYN Exam      HPI:  No LMP recorded. Patient is postmenopausal.  Menstrual Cycle: Patient's last menstrual period was approximately 9 years ago. She is not experiencing any vaginal bleeding, spotting, or pink discharge.  Sexually Activity: Patient is sexually active without difficulty. Fine with lubricant.   Bowel and Bladder function: Normal per patient. Some urinary urgency/leakage   Pap Smears: does not have a history of abnormal pap smears, Last pap was in 11/20 and was neg/neg.   Patient is not experiencing hot flashes.  Patient is not experiencing night sweats.  Patient is not experiencing vaginal dryness.  Patient  does not report any breast issues.  Patient's Last Mammogram was in 7/22 and was  Bi-rads 1  Patient's Last Dexa Scan was in 8/22 and was  Osteoporosis. She is not on any medications for bone loss prevention. She is taking Calcium and Vitamin D supplementation. She saw a rheumatologist last year, but didn't start anything yet. She had to finish up some dental work. She then plans to do the yearly infusion.   Patient does have a family history of breast or gyn cancers.  Patient  does not desire screening for STIs today.   Patient does not have a history of Domestic Violence/Verbal Abuse/Sexual Assault. She does feel safe in her current environment.     Past Medical History:  has a past medical history of Breast cancer screening by mammogram (07/22/2022), Cervical cancer screening (11/30/2020), Chicken pox, H/O bone density study (08/2022), and Osteoporosis.    Past Surgical History:  has a past surgical history that includes Tonsillectomy; Liver biopsy; Varney tooth extraction (1981); and Colonoscopy (09/2021).    Medications:   Current Outpatient Medications:   •  ascorbic acid, vitamin C, (VITAMIN C) 1,000 mg tablet, Take 500 mg by mouth daily., Disp: , Rfl:   •  cetirizine (ZyrTEC) 10 mg tablet, Take 10 mg by mouth  as needed for allergies., Disp: , Rfl:   •  cholecalciferol, vitamin D3, (VITAMIN D3 ORAL), Take 2,000 mg by mouth daily., Disp: , Rfl:   •  multivitamin tablet, Take 1 tablet by mouth daily., Disp: , Rfl:   •  URSODIOL ORAL, Take by mouth., Disp: , Rfl:       Allergies: has No Known Allergies.     Family History: family history includes BRCA1 Negative in her biological sister; BRCA2 Negative in her biological sister; Heart disease in her biological father; Hypertension in her biological mother; Leukemia in an other family member; Ovarian cancer in her biological sister; Stroke in her biological father.    Social History:   Social History     Tobacco Use   • Smoking status: Never   • Smokeless tobacco: Never   Substance Use Topics   • Alcohol use: No   • Drug use: No       Review of Systems  Constitutional:   No hot flashes.   No night sweats.   No unexpected weight changes.  HENT:   No oral ulcers.   No headaches related to menstrual cycle.   Breasts:   No changes to skin of the breast or nipple.   No nipple discharge.   No breast lumps/cysts.   No breast pain.   Patient has not noticed any changes to breasts.   Respiratory:   No shortness of breath.  Cardiovascular:   No chest pain  Gastrointestinal:   No changes in bowel habits.  Genitourinary:   No pain with urination.   No urgency with urination.   No increased frequency of urination.   Yes urinary incontinence.   No vaginal dryness.  No vaginal discharge.   No painful intercourse.   No genital sores.   No irregular menstrual cycles.   No heavy menstrual cycles.   No painful menstrual periods.   No bleeding between menstrual cycles.   No bleeding after intercourse.  Yes absence of menstrual periods.  Integument:   No changes to any existing genital skin lesions or moles.   No new vaginal skin lesions or moles.   No genital rashes.   Endocrine:   No increased hair growth   Psychiatric:   No anxiety.   No depression.   No suicidal ideation.  Patient does not have  "concerns for her safety.   Heme-Lymph:   No easy bruising.   No unexplained lumps.           Visit Vitals  /70   Ht 1.6 m (5' 3\")   Wt 55.2 kg (121 lb 12.8 oz)   BMI 21.58 kg/m²       OBGyn Exam  General Appearance: Alert, cooperative, no acute distress  Head: Normocephalic, without obvious abnormality  Breast: Right breast normal without mass, skin or nipple changes or axillary nodes. Left breast normal without mass, skin or nipple changes or axillary nodes.  Abdomen: Soft, nontender, nondistended,no masses, no organomegaly  Pelvic exam: VULVA: normal appearing vulva with no masses, tenderness or lesions. VAGINA: normal appearing vagina with normal color and discharge, no lesions. CERVIX: normal appearing cervix without discharge or lesions. UTERUS: uterus is normal size, shape, consistency and non-tender. ADNEXA: normal adnexa in size, nontender and no masses.  Extremities: no edema    Office Labs/Tests:       Assessment and Plan:  57 y.o. yo presents for an annual exam.   1. Pap  not collected today.  2. STI Testing: GC/CT declined. HIV/SA/Hep B declined.  3. Mammogram slip  not indicated today.  4. Dexa slip  not indicated today. She plans for infusions with rheumatology this year.   6. Return to office 1 year or prn  7. Patient to call for results in 7-10 days.     Noemí Gamez MD  "

## 2022-12-15 ENCOUNTER — OFFICE VISIT (OUTPATIENT)
Dept: FAMILY MEDICINE | Facility: CLINIC | Age: 57
End: 2022-12-15
Payer: COMMERCIAL

## 2022-12-15 VITALS
SYSTOLIC BLOOD PRESSURE: 118 MMHG | DIASTOLIC BLOOD PRESSURE: 64 MMHG | BODY MASS INDEX: 21.62 KG/M2 | TEMPERATURE: 99.1 F | HEIGHT: 63 IN | HEART RATE: 84 BPM | WEIGHT: 122 LBS
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
## 2022-12-15 PROCEDURE — 99213 OFFICE O/P EST LOW 20 MIN: CPT | Performed by: INTERNAL MEDICINE

## 2022-12-15 PROCEDURE — 3008F BODY MASS INDEX DOCD: CPT | Performed by: INTERNAL MEDICINE

## 2022-12-15 RX ORDER — CEPHALEXIN 500 MG/1
500 CAPSULE ORAL 4 TIMES DAILY
Qty: 28 CAPSULE | Refills: 0 | Status: SHIPPED | OUTPATIENT
Start: 2022-12-15 | End: 2022-12-22

## 2022-12-15 ASSESSMENT — ENCOUNTER SYMPTOMS
APNEA: 0
VOMITING: 0
ABDOMINAL DISTENTION: 0
EYE ITCHING: 0
HEMATURIA: 0
CHOKING: 0
FATIGUE: 0
CONSTIPATION: 0
NAUSEA: 0
DIAPHORESIS: 0
EYE PAIN: 0
PHOTOPHOBIA: 0
ACTIVITY CHANGE: 0
FEVER: 0
STRIDOR: 0
CHEST TIGHTNESS: 0
EYE REDNESS: 0
DIARRHEA: 0
EYE DISCHARGE: 0
DYSURIA: 0
RECTAL PAIN: 0
ANAL BLEEDING: 0
BLOOD IN STOOL: 0
FREQUENCY: 0
POLYDIPSIA: 0
FLANK PAIN: 0
COUGH: 0
ABDOMINAL PAIN: 0
POLYPHAGIA: 0
UNEXPECTED WEIGHT CHANGE: 0
PALPITATIONS: 0
APPETITE CHANGE: 0
DIFFICULTY URINATING: 0
CHILLS: 0
SHORTNESS OF BREATH: 0
WHEEZING: 0

## 2022-12-15 NOTE — PROGRESS NOTES
Jaspreet Goldstein M.D.   [facility]  [address], [address]  [place] [address]  [phone]     Reason for visit:   Chief Complaint   Patient presents with   • Scratches      HPI   Mary Brandon is a 57 y.o. female who presents for sick visit.   -pt reports about one week ago she was scratched by 2 cats that jumped on her. Notes bruising and scratches on her L thigh and buttock and R back. These overall seem to be improving as far as discomfort goes.   -denies fever or fatigue. No new nausea or malaise. No drainage.         Past Medical History:   Diagnosis Date   • Breast cancer screening by mammogram 07/22/2022    BIRADS CATEGORY 1 - NEGATIVE (NOR NC D) HETEROGENEOUS   • Cervical cancer screening 11/30/2020    Neg/ HPV neg   • Chicken pox    • H/O bone density study 08/2022    Osteoporosis   • Osteoporosis      Past Surgical History:   Procedure Laterality Date   • COLONOSCOPY  09/2021   • LIVER BIOPSY     • TONSILLECTOMY     • WISDOM TOOTH EXTRACTION  1981     Social History     Social History Narrative   • Not on file     Family History   Problem Relation Age of Onset   • Hypertension Biological Mother    • Stroke Biological Father         92 yrs   • Heart disease Biological Father         CHF   • Ovarian cancer Biological Sister    • BRCA1 Negative Biological Sister    • BRCA2 Negative Biological Sister    • Leukemia Other    • Breast cancer Neg Hx    • Colon cancer Neg Hx    • Diabetes Neg Hx    • Uterine cancer Neg Hx    • Cervical cancer Neg Hx      Patient has no known allergies.  Current Outpatient Medications   Medication Sig Dispense Refill   • ascorbic acid, vitamin C, (VITAMIN C) 1,000 mg tablet Take 500 mg by mouth daily.     • cephalexin (KEFLEX) 500 mg capsule Take 1 capsule (500 mg total) by mouth 4 (four) times a day for 7 days. 28 capsule 0   • cetirizine (ZyrTEC) 10 mg tablet Take 10 mg by mouth as needed for allergies.     • cholecalciferol, vitamin D3, (VITAMIN  "D3 ORAL) Take 2,000 mg by mouth daily.     • multivitamin tablet Take 1 tablet by mouth daily.     • URSODIOL ORAL Take by mouth.       No current facility-administered medications for this visit.       Review of Systems   Constitutional: Negative for activity change, appetite change, chills, diaphoresis, fatigue, fever and unexpected weight change.   Eyes: Negative for photophobia, pain, discharge, redness, itching and visual disturbance.   Respiratory: Negative for apnea, cough, choking, chest tightness, shortness of breath, wheezing and stridor.    Cardiovascular: Negative for chest pain, palpitations and leg swelling.   Gastrointestinal: Negative for abdominal distention, abdominal pain, anal bleeding, blood in stool, constipation, diarrhea, nausea, rectal pain and vomiting.   Endocrine: Negative for cold intolerance, heat intolerance, polydipsia, polyphagia and polyuria.   Genitourinary: Negative for decreased urine volume, difficulty urinating, dysuria, enuresis, flank pain, frequency, genital sores, hematuria and urgency.       Objective   Vitals:    12/15/22 0959   BP: 118/64   BP Location: Right upper arm   Patient Position: Sitting   Pulse: 84   Temp: 37.3 °C (99.1 °F)   TempSrc: Oral   Weight: 55.3 kg (122 lb)   Height: 1.6 m (5' 3\")     Body mass index is 21.61 kg/m².    Physical Exam  Constitutional:       General: She is not in acute distress.     Appearance: Normal appearance. She is not ill-appearing, toxic-appearing or diaphoretic.   Skin:     Comments: Scratch with mild surrounding erythema R mid back and L post thigh. Some bruising as well in these areas. R buttock area of bruising. No drainage.    Neurological:      Mental Status: She is alert and oriented to person, place, and time.   Psychiatric:         Mood and Affect: Mood normal.                 Assessment   Problem List Items Addressed This Visit    None  Visit Diagnoses     Cat scratch    -  Primary  -rec course of PO keflex and close " observation. Reach out with any new concerns. Discussed s/s to look out for.   -rec tetanus booster-pt will go to pharmacy today for this.                Jaspreet Goldstein MD  12/15/2022

## 2023-03-30 ENCOUNTER — TRANSCRIBE ORDERS (OUTPATIENT)
Dept: SCHEDULING | Age: 58
End: 2023-03-30

## 2023-03-30 DIAGNOSIS — R94.5 ABNORMAL RESULTS OF LIVER FUNCTION STUDIES: Primary | ICD-10-CM

## 2023-05-10 ENCOUNTER — HOSPITAL ENCOUNTER (OUTPATIENT)
Dept: RADIOLOGY | Facility: HOSPITAL | Age: 58
Discharge: HOME | End: 2023-05-10
Attending: INTERNAL MEDICINE
Payer: COMMERCIAL

## 2023-05-10 DIAGNOSIS — R94.5 ABNORMAL RESULTS OF LIVER FUNCTION STUDIES: ICD-10-CM

## 2023-05-10 PROCEDURE — 76981 USE PARENCHYMA: CPT | Mod: 59

## 2023-07-02 ENCOUNTER — HOSPITAL ENCOUNTER (EMERGENCY)
Facility: HOSPITAL | Age: 58
Discharge: HOME | End: 2023-07-02
Attending: EMERGENCY MEDICINE
Payer: COMMERCIAL

## 2023-07-02 ENCOUNTER — APPOINTMENT (EMERGENCY)
Dept: RADIOLOGY | Facility: HOSPITAL | Age: 58
End: 2023-07-02
Attending: EMERGENCY MEDICINE
Payer: COMMERCIAL

## 2023-07-02 VITALS
HEART RATE: 80 BPM | SYSTOLIC BLOOD PRESSURE: 125 MMHG | TEMPERATURE: 98 F | DIASTOLIC BLOOD PRESSURE: 78 MMHG | OXYGEN SATURATION: 100 % | HEIGHT: 63 IN | BODY MASS INDEX: 20.55 KG/M2 | RESPIRATION RATE: 18 BRPM | WEIGHT: 116 LBS

## 2023-07-02 DIAGNOSIS — T14.8XXA ABRASION: ICD-10-CM

## 2023-07-02 DIAGNOSIS — S63.502A SPRAIN OF LEFT WRIST, INITIAL ENCOUNTER: Primary | ICD-10-CM

## 2023-07-02 PROCEDURE — 3E0234Z INTRODUCTION OF SERUM, TOXOID AND VACCINE INTO MUSCLE, PERCUTANEOUS APPROACH: ICD-10-PCS | Performed by: EMERGENCY MEDICINE

## 2023-07-02 PROCEDURE — 73130 X-RAY EXAM OF HAND: CPT | Mod: LT

## 2023-07-02 PROCEDURE — 99283 EMERGENCY DEPT VISIT LOW MDM: CPT

## 2023-07-02 PROCEDURE — 90715 TDAP VACCINE 7 YRS/> IM: CPT | Performed by: EMERGENCY MEDICINE

## 2023-07-02 PROCEDURE — 63600000 HC DRUGS/DETAIL CODE: Performed by: EMERGENCY MEDICINE

## 2023-07-02 PROCEDURE — 90471 IMMUNIZATION ADMIN: CPT | Performed by: EMERGENCY MEDICINE

## 2023-07-02 RX ADMIN — TETANUS TOXOID, REDUCED DIPHTHERIA TOXOID AND ACELLULAR PERTUSSIS VACCINE, ADSORBED 0.5 ML: 5; 2.5; 8; 8; 2.5 SUSPENSION INTRAMUSCULAR at 17:35

## 2023-07-02 ASSESSMENT — ENCOUNTER SYMPTOMS
BACK PAIN: 0
WOUND: 1
HEADACHES: 0
LIGHT-HEADEDNESS: 0
NECK PAIN: 0

## 2023-07-02 NOTE — ED PROVIDER NOTES
Emergency Medicine Note  HPI   HISTORY OF PRESENT ILLNESS     Patient is 58-year-old female with history of osteoporosis who was working in her backyard today and tripped and fell back.  She fell on her outstretched left hand.  She denies head injury, neck pain, or back pain.  She has pain in her distal wrist.  She took ibuprofen and applied Arnica gel with some relief      History provided by:  Patient  Wrist Injury  Location:  Wrist  Wrist location:  L wrist  Pain details:     Quality:  Aching  Dislocation: no    Associated symptoms: no back pain and no neck pain          Patient History   PAST HISTORY     Reviewed from Nursing Triage:       Past Medical History:   Diagnosis Date   • Breast cancer screening by mammogram 07/22/2022    BIRADS CATEGORY 1 - NEGATIVE (NOR NC D) HETEROGENEOUS   • Cervical cancer screening 11/30/2020    Neg/ HPV neg   • Chicken pox    • H/O bone density study 08/2022    Osteoporosis   • Osteoporosis        Past Surgical History:   Procedure Laterality Date   • COLONOSCOPY  09/2021   • LIVER BIOPSY     • TONSILLECTOMY     • WISDOM TOOTH EXTRACTION  1981       Family History   Problem Relation Age of Onset   • Hypertension Biological Mother    • Stroke Biological Father         92 yrs   • Heart disease Biological Father         CHF   • Ovarian cancer Biological Sister    • BRCA1 Negative Biological Sister    • BRCA2 Negative Biological Sister    • Leukemia Other    • Breast cancer Neg Hx    • Colon cancer Neg Hx    • Diabetes Neg Hx    • Uterine cancer Neg Hx    • Cervical cancer Neg Hx        Social History     Tobacco Use   • Smoking status: Never   • Smokeless tobacco: Never   Substance Use Topics   • Alcohol use: No   • Drug use: No         Review of Systems   REVIEW OF SYSTEMS     Review of Systems   Cardiovascular: Negative for chest pain.   Musculoskeletal: Negative for back pain and neck pain.   Skin: Positive for wound (abrasion).   Neurological: Negative for light-headedness and  headaches.         VITALS     ED Vitals    Date/Time Temp Pulse Resp BP SpO2 [facility]   07/02/23 1540 36.7 °C (98 °F) 76 16 131/76 99 % KLS                       Physical Exam   PHYSICAL EXAM     Physical Exam  Constitutional:       Appearance: Normal appearance. She is normal weight.   HENT:      Head: Normocephalic.      Mouth/Throat:      Pharynx: Oropharynx is clear.   Eyes:      General: No scleral icterus.  Neck:      Comments: No c spine tenderness  Cardiovascular:      Rate and Rhythm: Normal rate.      Pulses: Normal pulses.   Pulmonary:      Effort: Pulmonary effort is normal. No respiratory distress.   Abdominal:      General: There is no distension.   Musculoskeletal:         General: No deformity. Normal range of motion.      Cervical back: Normal range of motion.      Comments: Left upper extremity  No deformity  Mild tenderness lateral distal ulnar/radial area.  Mild pain with range of motion of the wrist  Good pulses  No snuffbox tenderness   Skin:     General: Skin is warm and dry.      Comments: Small superficial abrasion distal left forearm   Neurological:      General: No focal deficit present.      Mental Status: She is alert and oriented to person, place, and time. Mental status is at baseline.   Psychiatric:         Mood and Affect: Mood normal.         Behavior: Behavior normal.         Thought Content: Thought content normal.           PROCEDURES     Procedures     DATA     Results     None          Imaging Results          X-RAY HAND LEFT 3+ VIEWS (Final result)  Result time 07/02/23 17:05:37    Final result                 Impression:    IMPRESSION: No acute osseous abnormalities.    TECHNIQUE: Four views of left hand.    COMMENT:  No acute fractures are identified. Normal alignment.  Diffuse osteopenia.  Scattered degenerative changes.                 Narrative:    CLINICAL HISTORY: fall  COMPARISON: Prior available studies.                                No orders to display       Scoring  tools                                  ED Course & MDM   MDM / ED COURSE / CLINICAL IMPRESSION / DISPO     Medical Decision Making  Patient presents after falling and bracing herself with her left arm.  There is a small abrasion.  She has pain in the distal forearm and wrist.  There is no acute fracture.  Neurovascularly intact.  Will place in wrist pain.  Close outpatient follow-up.    Abrasion: acute illness or injury  Sprain of left wrist, initial encounter: acute illness or injury  Amount and/or Complexity of Data Reviewed  External Data Reviewed: radiology.     Details: Bone scan 2022 documenting osteoporosis  Radiology: ordered and independent interpretation performed. Decision-making details documented in ED Course.     Details: no fx      Risk  Prescription drug management.          ED Course as of 07/02/23 1723   Sun Jul 02, 2023   1723 X-RAY HAND LEFT 3+ VIEWS  agree, no fracture [DP]      ED Course User Index  [DP] Eugene Grant MD     Clinical Impression      None               Eugene Grant MD  07/02/23 2001

## 2023-09-05 ENCOUNTER — TELEPHONE (OUTPATIENT)
Dept: FAMILY MEDICINE | Facility: CLINIC | Age: 58
End: 2023-09-05
Payer: COMMERCIAL

## 2023-09-05 DIAGNOSIS — Z12.31 ENCOUNTER FOR SCREENING MAMMOGRAM FOR MALIGNANT NEOPLASM OF BREAST: Primary | ICD-10-CM

## 2023-09-05 NOTE — TELEPHONE ENCOUNTER
Pt stated she is at the office and requesting a mammogram asap. Please notify pt when order is in. Please advise

## 2023-09-14 ENCOUNTER — HOSPITAL ENCOUNTER (OUTPATIENT)
Dept: RADIOLOGY | Facility: HOSPITAL | Age: 58
Discharge: HOME | End: 2023-09-14
Attending: INTERNAL MEDICINE
Payer: COMMERCIAL

## 2023-09-14 DIAGNOSIS — Z12.31 ENCOUNTER FOR SCREENING MAMMOGRAM FOR MALIGNANT NEOPLASM OF BREAST: ICD-10-CM

## 2023-09-14 PROCEDURE — 77063 BREAST TOMOSYNTHESIS BI: CPT

## 2023-09-14 PROCEDURE — 77067 SCR MAMMO BI INCL CAD: CPT

## 2023-09-17 ENCOUNTER — TELEPHONE (OUTPATIENT)
Dept: FAMILY MEDICINE | Facility: CLINIC | Age: 58
End: 2023-09-17
Payer: COMMERCIAL

## 2023-09-17 NOTE — TELEPHONE ENCOUNTER
Please notify pt her recent mammogram report returned and looked good. Nothing concerning seen. A follow up mammogram is recommended in one year.

## 2023-10-18 ENCOUNTER — TELEMEDICINE (OUTPATIENT)
Dept: FAMILY MEDICINE | Facility: CLINIC | Age: 58
End: 2023-10-18
Payer: COMMERCIAL

## 2023-10-18 DIAGNOSIS — U07.1 COVID-19: Primary | ICD-10-CM

## 2023-10-18 PROCEDURE — 99213 OFFICE O/P EST LOW 20 MIN: CPT | Mod: GT | Performed by: INTERNAL MEDICINE

## 2023-10-18 ASSESSMENT — ENCOUNTER SYMPTOMS
FACIAL ASYMMETRY: 0
STRIDOR: 0
SEIZURES: 0
DIZZINESS: 0
PALPITATIONS: 0
WHEEZING: 0
SPEECH DIFFICULTY: 0

## 2023-10-18 NOTE — PROGRESS NOTES
Verification of Patient Location:  The patient affirms they are currently located in the following state: [place]    Request for Consent:    Audio and Video Encounter   Hello, my name is Jaspreet Goldstein MD.  Before we proceed, can you please verify your identification by telling me your full name and date of birth?  Can you tell me who is in the room with you?    You and I are about to have a telemedicine check-in or visit because you have requested it.  This is a live video-conference.  I am a real person, speaking to you in real time.  There is no one else with me on the video-conference. I am not recording this conversation and I am asking you not to record it.  This telemedicine visit will be billed to your health insurance or you, if you are self-insured.  You understand you will be responsible for any copayments or coinsurances that apply to your telemedicine visit.  Communication platform used for this encounter:  [employer] Video Visit (Epic Video Client)       Before starting our telemedicine visit, I am required to get your consent for this virtual check-in or visit by telemedicine. Do you consent?      Patient Response to Request for Consent:  Yes      Visit Documentation:  Subjective     Patient ID: Mary Brandon is a 58 y.o. female.  1965      HPI     57 yo female sick visit.     -+covid testing this morning.   -sx started 2 days ago.   -having fever, cough, sore throat. Feels tired. +rhinorrhea. No wheezing or sob. No CP.   -some nausea-vomited last night. No vomiting today. No sig diarrhea.   -tolerating PO intake.   -sx now worsening today.   -hx elevated LFTs.       The following have been reviewed and updated as appropriate in this visit:   Tobacco  Allergies  Meds  Problems  Med Hx  Surg Hx  Fam Hx       Review of Systems   Eyes: Negative for visual disturbance.   Respiratory: Negative for wheezing and stridor.    Cardiovascular: Negative for chest pain, palpitations and leg  swelling.   Genitourinary: Negative.    Skin: Negative for rash.   Neurological: Negative for dizziness, seizures, syncope, facial asymmetry and speech difficulty.         Assessment/Plan   covid-19-discussed Paxlovid. Pt wishes to hold on medication, discussed supportive care including rest, fluids, etc. ER/UC precautions discussed. NOTIFY IF NO RESOLUTION OF SYMPTOMS OR IF ANY WORSENING OR NEW CONCERNS.     Time Spent:  I spent 10 minutes on this date of service performing the following activities: providing counseling and education.

## 2023-10-26 ENCOUNTER — TELEPHONE (OUTPATIENT)
Dept: FAMILY MEDICINE | Facility: CLINIC | Age: 58
End: 2023-10-26
Payer: COMMERCIAL

## 2023-10-26 NOTE — TELEPHONE ENCOUNTER
Pt is asking is it okay to keep appt for tomorrow for epp. Pt stated she is 11 days post Covid. Pt also wanted to know can she go back to her normal life or do she need to still wear a mask and quarantine even though she has not retested herself. Please advise

## 2023-10-27 NOTE — TELEPHONE ENCOUNTER
I would suggest she take a test and confirm this is negative or reschedule the appointment for 1 or more from now.

## 2023-11-03 ENCOUNTER — OFFICE VISIT (OUTPATIENT)
Dept: FAMILY MEDICINE | Facility: CLINIC | Age: 58
End: 2023-11-03
Payer: COMMERCIAL

## 2023-11-03 VITALS
RESPIRATION RATE: 18 BRPM | TEMPERATURE: 98.4 F | HEIGHT: 63 IN | WEIGHT: 120 LBS | OXYGEN SATURATION: 99 % | HEART RATE: 89 BPM | SYSTOLIC BLOOD PRESSURE: 110 MMHG | DIASTOLIC BLOOD PRESSURE: 70 MMHG | BODY MASS INDEX: 21.26 KG/M2

## 2023-11-03 DIAGNOSIS — Z00.00 ENCOUNTER FOR GENERAL MEDICAL EXAMINATION: Primary | ICD-10-CM

## 2023-11-03 DIAGNOSIS — R79.89 ELEVATED LIVER FUNCTION TESTS: ICD-10-CM

## 2023-11-03 DIAGNOSIS — M81.0 AGE-RELATED OSTEOPOROSIS WITHOUT CURRENT PATHOLOGICAL FRACTURE: ICD-10-CM

## 2023-11-03 PROCEDURE — 99396 PREV VISIT EST AGE 40-64: CPT | Performed by: INTERNAL MEDICINE

## 2023-11-03 PROCEDURE — 3008F BODY MASS INDEX DOCD: CPT | Performed by: INTERNAL MEDICINE

## 2023-11-03 ASSESSMENT — ENCOUNTER SYMPTOMS
COUGH: 0
JOINT SWELLING: 0
LIGHT-HEADEDNESS: 0
EYE DISCHARGE: 0
SHORTNESS OF BREATH: 0
VOICE CHANGE: 0
SINUS PAIN: 0
APNEA: 0
NECK STIFFNESS: 0
APPETITE CHANGE: 0
RECTAL PAIN: 0
DIZZINESS: 0
TREMORS: 0
MYALGIAS: 0
FACIAL SWELLING: 0
ACTIVITY CHANGE: 0
HEMATURIA: 0
FACIAL ASYMMETRY: 0
ARTHRALGIAS: 0
BLOOD IN STOOL: 0
ABDOMINAL DISTENTION: 0
WEAKNESS: 0
DIAPHORESIS: 0
EYE ITCHING: 0
UNEXPECTED WEIGHT CHANGE: 0
SPEECH DIFFICULTY: 0
FREQUENCY: 0
POLYDIPSIA: 0
ADENOPATHY: 0
CONSTIPATION: 0
RHINORRHEA: 0
EYE REDNESS: 0
HEADACHES: 0
POLYPHAGIA: 0
CHOKING: 0
SINUS PRESSURE: 0
CHILLS: 0
FATIGUE: 0
FEVER: 0
FLANK PAIN: 0
DIFFICULTY URINATING: 0
ABDOMINAL PAIN: 0
SEIZURES: 0
DIARRHEA: 0
WHEEZING: 0
ANAL BLEEDING: 0
EYE PAIN: 0
SORE THROAT: 0
BACK PAIN: 0
VOMITING: 0
PALPITATIONS: 0
BRUISES/BLEEDS EASILY: 0
DYSURIA: 0
NAUSEA: 0
PHOTOPHOBIA: 0
STRIDOR: 0
NUMBNESS: 0
CHEST TIGHTNESS: 0
TROUBLE SWALLOWING: 0
NECK PAIN: 0
PSYCHIATRIC NEGATIVE: 1

## 2023-11-03 NOTE — PROGRESS NOTES
Jaspreet Goldstein M.D.   [facility]  [address], [address]  [place] [address]  [phone]     Reason for visit:   Chief Complaint   Patient presents with    Annual Exam      HPI   Mary Brandon is a 58 y.o. female who presents for gen med exam.     -c-scope 9/2021, f/u rec 10 yrs for screening by GI.     -sees Dr. Keenan regularly. On ursodiol. Hx of elevated LFTs.   -hx of OP and +NAEL. Sees rheumatology-Dr. Mitchell at [place]. Was discussing reclast which she put on hold but does have appt with rheum scheduled in future to discuss.     -no abd pain or n/v. No BM changes or rectal bleeding.     -no CP or sob. No LH, syncope, palpitations, orthopnea or PND.     -had covid recently-17 days out from start of sx. Did not take anti-viral. Sx fully resolved.     -55, 77 ast/alt-labs per GI. Enzymes increased off ursodiol so pt resumed med and currently taking.     -denies any recent fractures. No frequent falls.       Past Medical History:   Diagnosis Date    Breast cancer screening by mammogram 07/22/2022    BIRADS CATEGORY 1 - NEGATIVE (NOR NC D) HETEROGENEOUS    Cervical cancer screening 11/30/2020    Neg/ HPV neg    Chicken pox     H/O bone density study 08/2022    Osteoporosis    Osteoporosis      Past Surgical History:   Procedure Laterality Date    COLONOSCOPY  09/2021    LIVER BIOPSY      TONSILLECTOMY      WISDOM TOOTH EXTRACTION  1981     Social History     Social History Narrative    Not on file     Family History   Problem Relation Age of Onset    Hypertension Biological Mother     Stroke Biological Father         92 yrs    Heart disease Biological Father         CHF    Ovarian cancer Biological Sister     BRCA1 Negative Biological Sister     BRCA2 Negative Biological Sister     Leukemia Other     Breast cancer Neg Hx     Colon cancer Neg Hx     Diabetes Neg Hx     Uterine cancer Neg Hx     Cervical cancer Neg Hx      Patient has no known allergies.  Current  Outpatient Medications   Medication Sig Dispense Refill    ascorbic acid, vitamin C, (VITAMIN C) 1,000 mg tablet Take 500 mg by mouth daily.      cetirizine (ZyrTEC) 10 mg tablet Take 10 mg by mouth as needed for allergies.      cholecalciferol, vitamin D3, (VITAMIN D3 ORAL) Take 2,000 mg by mouth daily.      multivitamin tablet Take 1 tablet by mouth daily.      URSODIOL ORAL Take by mouth.       No current facility-administered medications for this visit.       Review of Systems   Constitutional: Negative for activity change, appetite change, chills, diaphoresis, fatigue, fever and unexpected weight change.   HENT: Negative for congestion, dental problem, drooling, ear discharge, ear pain, facial swelling, hearing loss, mouth sores, nosebleeds, postnasal drip, rhinorrhea, sinus pressure, sinus pain, sneezing, sore throat, tinnitus, trouble swallowing and voice change.    Eyes: Negative for photophobia, pain, discharge, redness, itching and visual disturbance.   Respiratory: Negative for apnea, cough, choking, chest tightness, shortness of breath, wheezing and stridor.    Cardiovascular: Negative for chest pain, palpitations and leg swelling.   Gastrointestinal: Negative for abdominal distention, abdominal pain, anal bleeding, blood in stool, constipation, diarrhea, nausea, rectal pain and vomiting.   Endocrine: Negative for cold intolerance, heat intolerance, polydipsia, polyphagia and polyuria.   Genitourinary: Negative for difficulty urinating, dysuria, flank pain, frequency, hematuria, pelvic pain and urgency.   Musculoskeletal: Negative for arthralgias, back pain, gait problem, joint swelling, myalgias, neck pain and neck stiffness.   Skin: Negative for rash.   Allergic/Immunologic: Negative for immunocompromised state.   Neurological: Negative for dizziness, tremors, seizures, syncope, facial asymmetry, speech difficulty, weakness, light-headedness, numbness and headaches.   Hematological: Negative for  "adenopathy. Does not bruise/bleed easily.   Psychiatric/Behavioral: Negative.        Objective   Vitals:    11/03/23 0926   BP: 110/70   BP Location: Right upper arm   Patient Position: Sitting   Pulse: 89   Resp: 18   Temp: 36.9 °C (98.4 °F)   SpO2: 99%   Weight: 54.4 kg (120 lb)   Height: 1.6 m (5' 3\")     Body mass index is 21.26 kg/m².    Physical Exam  Vitals reviewed.   Constitutional:       General: She is not in acute distress.     Appearance: She is well-developed. She is not diaphoretic.   HENT:      Head: Normocephalic and atraumatic.      Right Ear: Tympanic membrane, ear canal and external ear normal. There is no impacted cerumen.      Left Ear: Tympanic membrane, ear canal and external ear normal. There is no impacted cerumen.      Nose: Nose normal.   Eyes:      General: No scleral icterus.        Right eye: No discharge.         Left eye: No discharge.      Conjunctiva/sclera: Conjunctivae normal.   Neck:      Thyroid: No thyromegaly.      Vascular: No carotid bruit or JVD.      Trachea: No tracheal deviation.   Cardiovascular:      Rate and Rhythm: Normal rate and regular rhythm.      Heart sounds: Normal heart sounds. No murmur heard.     No friction rub. No gallop.   Pulmonary:      Effort: Pulmonary effort is normal. No respiratory distress.      Breath sounds: Normal breath sounds. No stridor. No wheezing, rhonchi or rales.   Chest:      Chest wall: No tenderness.   Abdominal:      General: Bowel sounds are normal. There is no distension.      Palpations: Abdomen is soft. There is no mass.      Tenderness: There is no abdominal tenderness. There is no right CVA tenderness, left CVA tenderness, guarding or rebound.      Hernia: No hernia is present.   Musculoskeletal:      Cervical back: Normal range of motion and neck supple. No rigidity or tenderness.      Right lower leg: No edema.      Left lower leg: No edema.   Lymphadenopathy:      Cervical: No cervical adenopathy.   Skin:     General: " Skin is warm and dry.      Findings: No erythema or rash.   Neurological:      Mental Status: She is alert and oriented to person, place, and time.      Cranial Nerves: No cranial nerve deficit.      Coordination: Coordination normal.   Psychiatric:         Mood and Affect: Mood normal.         Behavior: Behavior normal.                 Assessment   Problem List Items Addressed This Visit        Digestive    Elevated liver function tests   Other Visit Diagnoses     Encounter for general medical examination    -  Primary    Age-related osteoporosis without current pathological fracture             -continue with heart healthy diet, regular exercise.   -check labs including lipids, cbc, chem7, Alc, TSH, vit D.   -cont rheum f/u for OP mgt.   -cont GI fu for elevated LFTs. On ursodiol.   -mammo due 9/2024.   -c-scope utd-see above.   -f/b gyn.   -dexa per rheumatology.   -will return for flu shot. rec shingrix as well. tdap utd.   -reach out with any new concerns/sx.        Jaspreet Goldstein MD  11/3/2023

## 2023-11-08 LAB
25(OH)D3+25(OH)D2 SERPL-MCNC: 63.7 NG/ML (ref 30–100)
BASOPHILS # BLD AUTO: 0.1 X10E3/UL (ref 0–0.2)
BASOPHILS NFR BLD AUTO: 1 %
BUN SERPL-MCNC: 13 MG/DL (ref 8–27)
BUN/CREAT SERPL: 22 (ref 12–28)
CALCIUM SERPL-MCNC: 9.4 MG/DL (ref 8.7–10.3)
CHLORIDE SERPL-SCNC: 102 MMOL/L (ref 96–106)
CHOLEST SERPL-MCNC: 188 MG/DL (ref 100–199)
CO2 SERPL-SCNC: 23 MMOL/L (ref 20–29)
CREAT SERPL-MCNC: 0.59 MG/DL (ref 0.57–1)
EGFRCR SERPLBLD CKD-EPI 2021: 103 ML/MIN/1.73
EOSINOPHIL # BLD AUTO: 0.1 X10E3/UL (ref 0–0.4)
EOSINOPHIL NFR BLD AUTO: 2 %
ERYTHROCYTE [DISTWIDTH] IN BLOOD BY AUTOMATED COUNT: 11.8 % (ref 11.7–15.4)
GLUCOSE SERPL-MCNC: 82 MG/DL (ref 70–99)
HBA1C MFR BLD: 5.4 % (ref 4.8–5.6)
HCT VFR BLD AUTO: 37.7 % (ref 34–46.6)
HDLC SERPL-MCNC: 64 MG/DL
HGB BLD-MCNC: 12.7 G/DL (ref 11.1–15.9)
IMM GRANULOCYTES # BLD AUTO: 0 X10E3/UL (ref 0–0.1)
IMM GRANULOCYTES NFR BLD AUTO: 0 %
LDLC SERPL CALC-MCNC: 114 MG/DL (ref 0–99)
LYMPHOCYTES # BLD AUTO: 1.2 X10E3/UL (ref 0.7–3.1)
LYMPHOCYTES NFR BLD AUTO: 21 %
MCH RBC QN AUTO: 31.2 PG (ref 26.6–33)
MCHC RBC AUTO-ENTMCNC: 33.7 G/DL (ref 31.5–35.7)
MCV RBC AUTO: 93 FL (ref 79–97)
MONOCYTES # BLD AUTO: 0.4 X10E3/UL (ref 0.1–0.9)
MONOCYTES NFR BLD AUTO: 7 %
NEUTROPHILS # BLD AUTO: 3.9 X10E3/UL (ref 1.4–7)
NEUTROPHILS NFR BLD AUTO: 69 %
PLATELET # BLD AUTO: 349 X10E3/UL (ref 150–450)
POTASSIUM SERPL-SCNC: 4.9 MMOL/L (ref 3.5–5.2)
RBC # BLD AUTO: 4.07 X10E6/UL (ref 3.77–5.28)
SODIUM SERPL-SCNC: 142 MMOL/L (ref 134–144)
TRIGL SERPL-MCNC: 51 MG/DL (ref 0–149)
TSH SERPL DL<=0.005 MIU/L-ACNC: 2.62 UIU/ML (ref 0.45–4.5)
VLDLC SERPL CALC-MCNC: 10 MG/DL (ref 5–40)
WBC # BLD AUTO: 5.7 X10E3/UL (ref 3.4–10.8)

## 2023-11-17 NOTE — H&P
"   GI Consult Note          Subjective   Mary Brandon is a 56 y.o. female who was admitted for History of colon polyps [Z86.010].       History of colon polyps, unknown prior histology, last 8/13.    Past Medical History:   Diagnosis Date   • Breast cancer screening by mammogram 06/12/2020    BIRADS CATEGORY 1 - NEGATIVE    • Cervical cancer screening 11/30/2020    Neg/ HPV neg   • Chicken pox    • Osteoporosis      Past Surgical History:   Procedure Laterality Date   • LIVER BIOPSY     • TONSILLECTOMY     • WISDOM TOOTH EXTRACTION  1981     No Known Allergies    Home Medications:  •  ascorbic acid (vitamin C), Take 500 mg by mouth daily.  •  cetirizine, Take 10 mg by mouth as needed for allergies.  •  cholecalciferol, vitamin D3, (VITAMIN D3 ORAL), Take 2,000 mg by mouth daily.  •  multivitamin, Take 1 tablet by mouth daily.        Physical Exam    Physical Exam  Visit Vitals  BP (!) 118/58   Pulse 78   Temp 36.2 °C (97.1 °F)   Resp 18   Ht 1.626 m (5' 4\")   Wt 52.2 kg (115 lb)   BMI 19.74 kg/m²       General appearance: no distress  Head: normocephalic  Lungs: clear to auscultation anteriorly   Heart: regular rate   Abdomen: soft, non-tender; bowel sounds normal; no masses, no organomegaly  Neurologic: awake and alert and oriented        Assessment     Proceed with surveillance colonoscopy.  S/p prep.  COVID negative.  Consent obtained.           Alex Joseph MD  9/23/2021         " Medication clarification request doen over the phone  Needed verification of strength and frequency.

## 2023-12-18 ENCOUNTER — OFFICE VISIT (OUTPATIENT)
Dept: OBSTETRICS AND GYNECOLOGY | Facility: CLINIC | Age: 58
End: 2023-12-18
Payer: COMMERCIAL

## 2023-12-18 VITALS
BODY MASS INDEX: 21.32 KG/M2 | HEIGHT: 63 IN | SYSTOLIC BLOOD PRESSURE: 110 MMHG | WEIGHT: 120.3 LBS | DIASTOLIC BLOOD PRESSURE: 70 MMHG

## 2023-12-18 DIAGNOSIS — Z12.4 PAP SMEAR FOR CERVICAL CANCER SCREENING: ICD-10-CM

## 2023-12-18 DIAGNOSIS — N39.41 URGE INCONTINENCE: ICD-10-CM

## 2023-12-18 DIAGNOSIS — Z01.419 ENCOUNTER FOR GYNECOLOGICAL EXAMINATION WITHOUT ABNORMAL FINDING: Primary | ICD-10-CM

## 2023-12-18 DIAGNOSIS — N32.81 OAB (OVERACTIVE BLADDER): ICD-10-CM

## 2023-12-18 PROCEDURE — 99396 PREV VISIT EST AGE 40-64: CPT | Performed by: OBSTETRICS & GYNECOLOGY

## 2023-12-18 PROCEDURE — 3008F BODY MASS INDEX DOCD: CPT | Performed by: OBSTETRICS & GYNECOLOGY

## 2023-12-18 NOTE — PROGRESS NOTES
Visit Date: 12/18/2023  Mary Brandon is 58 y.o. female presenting today for Annual GYN Exam      HPI:  No LMP recorded. Patient is postmenopausal.  Menstrual Cycle: Patient's last menstrual period was approximately 10 years ago. She is not experiencing any vaginal bleeding, spotting, or pink discharge.  Sexually Activity: Patient is sexually active without difficulty. Dry but fine with lubricant.   Bowel and Bladder function: Having some issues with urinary urgency. There were three times she could not control her bladder which is upsetting to her.    Pap Smears: does not have a history of abnormal pap smears, Last pap was in 11/20 and was neg/neg.   Patient is not experiencing hot flashes.  Patient is not experiencing night sweats.  Patient is experiencing vaginal dryness.  Patient  does not report any breast issues.  Patient's Last Mammogram was in 9/23 and was  Bi-rads 1  Patient's Last Dexa Scan was in 8/22 and was  Osteoporosis. She is not on any medications for bone loss prevention. She is taking Calcium and Vitamin D supplementation. She has a follow up appointment with rheumatology this week. They had suggested treatment 1 year ago, but she had a lot of dental work that needed to be done prior to this.   Patient does have a family history of breast or gyn cancers.  Patient  does not desire screening for STIs today.   Patient does not have a history of Domestic Violence/Verbal Abuse/Sexual Assault. She does feel safe in her current environment.     Past Medical History:  has a past medical history of Breast cancer screening by mammogram (09/14/2023), Cervical cancer screening (11/30/2020), Chicken pox, H/O bone density study (08/2022), and Osteoporosis.    Past Surgical History:  has a past surgical history that includes Tonsillectomy; Liver biopsy; [place] tooth extraction (1981); and Colonoscopy (09/2021).    Medications:   Current Outpatient Medications:   •  ascorbic acid, vitamin C, (VITAMIN C) 1,000 mg  tablet, Take 500 mg by mouth daily., Disp: , Rfl:   •  cetirizine (ZyrTEC) 10 mg tablet, Take 10 mg by mouth as needed for allergies., Disp: , Rfl:   •  cholecalciferol, vitamin D3, (VITAMIN D3 ORAL), Take 2,000 mg by mouth daily., Disp: , Rfl:   •  multivitamin tablet, Take 1 tablet by mouth daily., Disp: , Rfl:   •  URSODIOL ORAL, Take by mouth., Disp: , Rfl:       Allergies: has No Known Allergies.     Family History: family history includes BRCA1 Negative in her biological sister; BRCA2 Negative in her biological sister; Heart disease in her biological father; Hypertension in her biological mother; Leukemia in an other family member; Ovarian cancer in her biological sister; Stroke in her biological father.    Social History:   Social History     Tobacco Use   • Smoking status: Never   • Smokeless tobacco: Never   Substance Use Topics   • Alcohol use: No   • Drug use: No       Review of Systems  Constitutional:   No hot flashes.   No night sweats.   No unexpected weight changes.  HENT:   No oral ulcers.   No headaches related to menstrual cycle.   Breasts:   No changes to skin of the breast or nipple.   No nipple discharge.   No breast lumps/cysts.   No breast pain.   Patient has not noticed any changes to breasts.   Respiratory:   No shortness of breath.  Cardiovascular:   No chest pain  Gastrointestinal:   No changes in bowel habits.  Genitourinary:   No pain with urination.   Yes urgency with urination.   No increased frequency of urination.   Yes urinary incontinence.   Yes vaginal dryness.  No vaginal discharge.   No painful intercourse.   No genital sores.   No irregular menstrual cycles.   No heavy menstrual cycles.   No painful menstrual periods.   No bleeding between menstrual cycles.   No bleeding after intercourse.  No absence of menstrual periods.  Integument:   No changes to any existing genital skin lesions or moles.   No new vaginal skin lesions or moles.   No genital rashes.   Endocrine:   No  "increased hair growth   Psychiatric:   No anxiety.   No depression.   No suicidal ideation.  Patient does not have concerns for her safety.   Heme-Lymph:   No easy bruising.   No unexplained lumps.           Visit Vitals  /70   Ht 1.6 m (5' 3\")   Wt 54.6 kg (120 lb 4.8 oz)   BMI 21.31 kg/m²       OBGyn Exam  General Appearance: Alert, cooperative, no acute distress  Head: Normocephalic, without obvious abnormality  Breast: Right breast normal without mass, skin or nipple changes or axillary nodes. Left breast normal without mass, skin or nipple changes or axillary nodes.  Abdomen: Soft, nontender, nondistended,no masses, no organomegaly  Pelvic exam: VULVA: normal appearing vulva with no masses, tenderness or lesions. VAGINA: normal appearing vagina with normal color and discharge, no lesions. CERVIX: normal appearing cervix without discharge or lesions. UTERUS: uterus is normal size, shape, consistency and non-tender. ADNEXA: normal adnexa in size, nontender and no masses.  Extremities: no edema    Office Labs/Tests:       Assessment and Plan:  58 y.o. yo presents for an annual exam.   1. Pap  with HPV collected today.  2. STI Testing: GC/CT declined. HIV/SA/Hep B declined.  3. Mammogram slip  not indicated today.  4. Dexa slip  not indicated today.   6. Return to office 1 year or prn  7. Patient to call for results in 7-10 days.   8.  OAB: patient reports symptoms of OAB. Discussion had with patient on options for treatment including lifestyle changes, weight loss, Kegel exercises, bladder training, pelvic floor physical therapy, or medications. We discussed risks and benefits of medications including dry mouth, dry eyes, constipation, and possible links to dementia. She declines medications at this time. Handouts for OAB, bladder training, and pelvic floor exercises given.       Noemí Gamez MD  "

## 2023-12-23 LAB
CYTOLOGIST CVX/VAG CYTO: NORMAL
CYTOLOGY CVX/VAG DOC CYTO: NORMAL
CYTOLOGY CVX/VAG DOC THIN PREP: NORMAL
DX ICD CODE: NORMAL
HPV I/H RISK 4 DNA CVX QL PROBE+SIG AMP: NEGATIVE
LAB CORP NOTE:: NORMAL
LC HPV GENOTYPE REFLEX: NORMAL
OTHER STN SPEC: NORMAL
STAT OF ADQ CVX/VAG CYTO-IMP: NORMAL

## 2024-01-23 ENCOUNTER — TELEMEDICINE (OUTPATIENT)
Dept: FAMILY MEDICINE | Facility: CLINIC | Age: 59
End: 2024-01-23
Payer: COMMERCIAL

## 2024-01-23 DIAGNOSIS — R09.81 NASAL CONGESTION: Primary | ICD-10-CM

## 2024-01-23 PROCEDURE — 99213 OFFICE O/P EST LOW 20 MIN: CPT | Mod: GT | Performed by: INTERNAL MEDICINE

## 2024-01-23 ASSESSMENT — ENCOUNTER SYMPTOMS
FACIAL ASYMMETRY: 0
NAUSEA: 0
DIZZINESS: 0
WHEEZING: 0
SPEECH DIFFICULTY: 0
ANAL BLEEDING: 0
ABDOMINAL DISTENTION: 0
SHORTNESS OF BREATH: 0
CONSTIPATION: 0
RECTAL PAIN: 0
STRIDOR: 0
BLOOD IN STOOL: 0
ABDOMINAL PAIN: 0
DIARRHEA: 0
VOMITING: 0
FEVER: 0
DIAPHORESIS: 0
CHILLS: 0

## 2024-01-23 NOTE — PROGRESS NOTES
Verification of Patient Location:  The patient affirms they are currently located in the following state: [place]    Request for Consent:    Audio and Video Encounter   Hello, my name is Jaspreet Goldstein MD.  Before we proceed, can you please verify your identification by telling me your full name and date of birth?  Can you tell me who is in the room with you?    You and I are about to have a telemedicine check-in or visit because you have requested it.  This is a live video-conference.  I am a real person, speaking to you in real time.  There is no one else with me on the video-conference. I am not recording this conversation and I am asking you not to record it.  This telemedicine visit will be billed to your health insurance or you, if you are self-insured.  You understand you will be responsible for any copayments or coinsurances that apply to your telemedicine visit.  Communication platform used for this encounter:  [employer] Video Visit (Epic Video Client)       Before starting our telemedicine visit, I am required to get your consent for this virtual check-in or visit by telemedicine. Do you consent?      Patient Response to Request for Consent:  Yes      Visit Documentation:  Subjective     Patient ID: Mary Brandon is a 58 y.o. female.  1965      HPI     57 yo female sick visit.     -c/o URI sx. C/o nasal congestion for 2-3 weeks. Started with cough, rhinorrhea which is now improved. Residual sx is mainly congestion. Using decongestant prn. +HA.   -no home covid testing.   -no fever/sweats.   -no sob or wheezing.   -no n/v/d.   -some fatigue.   -does take zyrtec regularly.   -using saline NS.             The following have been reviewed and updated as appropriate in this visit:        Review of Systems   Constitutional: Negative for chills, diaphoresis and fever.   HENT: Negative for dental problem, drooling, ear discharge and ear pain.    Eyes: Negative for visual disturbance.   Respiratory:  Negative for shortness of breath, wheezing and stridor.    Gastrointestinal: Negative for abdominal distention, abdominal pain, anal bleeding, blood in stool, constipation, diarrhea, nausea, rectal pain and vomiting.   Neurological: Negative for dizziness, syncope, facial asymmetry and speech difficulty.         Assessment/Plan   Nasal congestion/URI-pt will try adding flonase NS, cont saline NS, sudafed prn, tylenol prn. NOTIFY IF NO RESOLUTION OF SYMPTOMS OR IF ANY WORSENING OR NEW CONCERNS.   -t/c abx if no improvement.     Time Spent:  I spent 10 minutes on this date of service performing the following activities: providing counseling and education.

## 2024-03-12 ENCOUNTER — TELEPHONE (OUTPATIENT)
Dept: FAMILY MEDICINE | Facility: CLINIC | Age: 59
End: 2024-03-12
Payer: COMMERCIAL

## 2024-03-12 RX ORDER — FLUTICASONE PROPIONATE 50 MCG
2 SPRAY, SUSPENSION (ML) NASAL DAILY
Qty: 16 G | Refills: 3 | Status: SHIPPED | OUTPATIENT
Start: 2024-03-12

## 2024-03-12 NOTE — TELEPHONE ENCOUNTER
----- Message from Mary Brandon sent at 3/12/2024  9:26 AM EDT -----  Regarding: Nasal spray for allergies  Contact: [phone]  Hi, Dr. Goldstein,    I am requesting a refill of the nasal spray you prescribed for my allergies before, fluticasone 50mcg. I didn't see it on the meds list, so therefore I am messaging you.    Thank you,  Mary Brandon

## 2024-08-19 ENCOUNTER — TELEPHONE (OUTPATIENT)
Dept: FAMILY MEDICINE | Facility: CLINIC | Age: 59
End: 2024-08-19
Payer: COMMERCIAL

## 2024-08-19 DIAGNOSIS — Z12.31 ENCOUNTER FOR SCREENING MAMMOGRAM FOR MALIGNANT NEOPLASM OF BREAST: Primary | ICD-10-CM

## 2024-09-16 ENCOUNTER — HOSPITAL ENCOUNTER (OUTPATIENT)
Dept: RADIOLOGY | Facility: HOSPITAL | Age: 59
Discharge: HOME | End: 2024-09-16
Attending: INTERNAL MEDICINE
Payer: COMMERCIAL

## 2024-09-16 DIAGNOSIS — Z12.31 ENCOUNTER FOR SCREENING MAMMOGRAM FOR MALIGNANT NEOPLASM OF BREAST: ICD-10-CM

## 2024-09-16 PROCEDURE — 77063 BREAST TOMOSYNTHESIS BI: CPT

## 2024-09-16 PROCEDURE — 77067 SCR MAMMO BI INCL CAD: CPT

## 2024-11-08 ENCOUNTER — OFFICE VISIT (OUTPATIENT)
Dept: FAMILY MEDICINE | Facility: CLINIC | Age: 59
End: 2024-11-08
Payer: COMMERCIAL

## 2024-11-08 VITALS
DIASTOLIC BLOOD PRESSURE: 68 MMHG | WEIGHT: 121.8 LBS | HEIGHT: 63 IN | TEMPERATURE: 97.7 F | OXYGEN SATURATION: 99 % | HEART RATE: 66 BPM | SYSTOLIC BLOOD PRESSURE: 112 MMHG | BODY MASS INDEX: 21.58 KG/M2

## 2024-11-08 DIAGNOSIS — M81.0 AGE-RELATED OSTEOPOROSIS WITHOUT CURRENT PATHOLOGICAL FRACTURE: ICD-10-CM

## 2024-11-08 DIAGNOSIS — K75.89 CHOLESTATIC HEPATITIS: ICD-10-CM

## 2024-11-08 DIAGNOSIS — Z13.220 LIPID SCREENING: ICD-10-CM

## 2024-11-08 DIAGNOSIS — Z00.00 GENERAL MEDICAL EXAM: Primary | ICD-10-CM

## 2024-11-08 PROCEDURE — 99396 PREV VISIT EST AGE 40-64: CPT | Mod: 25 | Performed by: INTERNAL MEDICINE

## 2024-11-08 PROCEDURE — 90471 IMMUNIZATION ADMIN: CPT | Performed by: INTERNAL MEDICINE

## 2024-11-08 PROCEDURE — 3008F BODY MASS INDEX DOCD: CPT | Performed by: INTERNAL MEDICINE

## 2024-11-08 PROCEDURE — 90656 IIV3 VACC NO PRSV 0.5 ML IM: CPT | Performed by: INTERNAL MEDICINE

## 2024-11-08 ASSESSMENT — ENCOUNTER SYMPTOMS
ACTIVITY CHANGE: 0
ABDOMINAL PAIN: 0
RHINORRHEA: 0
EYE ITCHING: 0
UNEXPECTED WEIGHT CHANGE: 0
RECTAL PAIN: 0
SORE THROAT: 0
ADENOPATHY: 0
MYALGIAS: 0
NAUSEA: 0
DIZZINESS: 0
CONSTIPATION: 0
POLYDIPSIA: 0
SEIZURES: 0
FATIGUE: 0
VOMITING: 0
EYE PAIN: 0
COUGH: 0
ABDOMINAL DISTENTION: 0
CHEST TIGHTNESS: 0
HEMATURIA: 0
WEAKNESS: 0
PALPITATIONS: 0
HEADACHES: 0
SINUS PRESSURE: 0
APPETITE CHANGE: 0
PHOTOPHOBIA: 0
STRIDOR: 0
JOINT SWELLING: 0
BACK PAIN: 0
ARTHRALGIAS: 0
DIARRHEA: 0
FLANK PAIN: 0
TROUBLE SWALLOWING: 0
WHEEZING: 0
FACIAL SWELLING: 0
SPEECH DIFFICULTY: 0
CHILLS: 0
NECK STIFFNESS: 0
LIGHT-HEADEDNESS: 0
APNEA: 0
PSYCHIATRIC NEGATIVE: 1
ANAL BLEEDING: 0
NECK PAIN: 0
TREMORS: 0
VOICE CHANGE: 0
SINUS PAIN: 0
EYE REDNESS: 0
NUMBNESS: 0
SHORTNESS OF BREATH: 0
DIAPHORESIS: 0
CHOKING: 0
POLYPHAGIA: 0
EYE DISCHARGE: 0
FEVER: 0
FACIAL ASYMMETRY: 0
DYSURIA: 0
BLOOD IN STOOL: 0
DIFFICULTY URINATING: 0
BRUISES/BLEEDS EASILY: 0

## 2024-11-08 ASSESSMENT — PATIENT HEALTH QUESTIONNAIRE - PHQ9: SUM OF ALL RESPONSES TO PHQ9 QUESTIONS 1 & 2: 0

## 2024-11-08 NOTE — PROGRESS NOTES
Jaspreet Goldstein M.D.   [facility]  [address], [address]  [place] [address]  [phone]     Reason for visit:   Chief Complaint   Patient presents with    Annual Exam      HPI   Mary Brandon is a 59 y.o. female who presents for gen med exam.     -had covid vaccine recently.     -mammo neg 9/2024.     -cholestatic hepatitis-f/b Dr. Keenan. Remains on ursodiol. Tolerating med fine at current time.     -getting reclast annually per rheum, Dr. Mitchell at [place].     -9/2021-c-scope, f/u rec 10 yrs for screening per GI.     -stays active, pilates.     -no CP, sob, LH, edema.     -no BM c/o. No unexplained wt changes. No fever/sweats.     -at times can have some urinary incontinence. Some stress and urge component. No dysuria, gross hematuria, flank or pelvic pain. Has discussed with gyn as well.           Past Medical History:   Diagnosis Date    Breast cancer screening by mammogram 09/14/2023    BI-RADS CATEGORY 1 - NEGATIVE  (NOR NC D)   HETEROGENEOUS    Cervical cancer screening 11/30/2020    Neg/ HPV neg    Chicken pox     H/O bone density study 08/2022    Osteoporosis    Osteoporosis      Past Surgical History   Procedure Laterality Date    Colonoscopy  09/2021    Liver biopsy      [facility] COLSC FLX W/RMVL OF TUMOR POLYP LESION SNARE TQ [86521 (CPT®)] N/A 9/23/2021    Performed by Alex Joseph MD at [facility]    Tonsillectomy      [place] tooth extraction  1981     Social History     Social History Narrative    Not on file     Family History   Problem Relation Name Age of Onset    Hypertension Biological Mother      Stroke Biological Father          92 yrs    Heart disease Biological Father          CHF    Ovarian cancer Biological Sister      BRCA1 Negative Biological Sister      BRCA2 Negative Biological Sister      Leukemia Other daughter     Breast cancer Neg Hx      Colon cancer Neg Hx      Diabetes Neg Hx      Uterine cancer Neg Hx      Cervical cancer Neg Hx       Patient has no  known allergies.  Current Outpatient Medications   Medication Sig Dispense Refill    RED YEAST RICE ORAL Take by mouth.      cetirizine (ZyrTEC) 10 mg tablet Take 10 mg by mouth as needed for allergies.      cholecalciferol, vitamin D3, (VITAMIN D3 ORAL) Take 2,000 mg by mouth daily.      fluticasone propionate (FLONASE) 50 mcg/actuation nasal spray Administer 2 sprays into each nostril daily. 16 g 3    multivitamin tablet Take 1 tablet by mouth daily.      URSODIOL ORAL Take by mouth.       No current facility-administered medications for this visit.       Review of Systems   Constitutional:  Negative for activity change, appetite change, chills, diaphoresis, fatigue, fever and unexpected weight change.   HENT:  Negative for congestion, dental problem, drooling, ear discharge, ear pain, facial swelling, hearing loss, mouth sores, nosebleeds, postnasal drip, rhinorrhea, sinus pressure, sinus pain, sneezing, sore throat, tinnitus, trouble swallowing and voice change.    Eyes:  Negative for photophobia, pain, discharge, redness, itching and visual disturbance.   Respiratory:  Negative for apnea, cough, choking, chest tightness, shortness of breath, wheezing and stridor.    Cardiovascular:  Negative for chest pain, palpitations and leg swelling.   Gastrointestinal:  Negative for abdominal distention, abdominal pain, anal bleeding, blood in stool, constipation, diarrhea, nausea, rectal pain and vomiting.   Endocrine: Negative for cold intolerance, heat intolerance, polydipsia, polyphagia and polyuria.   Genitourinary:  Negative for difficulty urinating, dysuria, flank pain, hematuria, pelvic pain, vaginal discharge and vaginal pain.   Musculoskeletal:  Negative for arthralgias, back pain, gait problem, joint swelling, myalgias, neck pain and neck stiffness.   Skin:  Negative for rash.   Allergic/Immunologic: Negative for immunocompromised state.   Neurological:  Negative for dizziness, tremors, seizures, syncope, facial  "asymmetry, speech difficulty, weakness, light-headedness, numbness and headaches.   Hematological:  Negative for adenopathy. Does not bruise/bleed easily.   Psychiatric/Behavioral: Negative.         Objective   Vitals:    11/08/24 0923   BP: 112/68   BP Location: Right upper arm   Patient Position: Sitting   Pulse: 66   Temp: 36.5 °C (97.7 °F)   TempSrc: Tympanic   SpO2: 99%   Weight: 55.2 kg (121 lb 12.8 oz)   Height: 1.59 m (5' 2.6\")     Body mass index is 21.85 kg/m².    Physical Exam  Vitals reviewed.   Constitutional:       General: She is not in acute distress.     Appearance: She is well-developed. She is not diaphoretic.   HENT:      Mouth/Throat:      Pharynx: No oropharyngeal exudate or posterior oropharyngeal erythema.   Eyes:      General: No scleral icterus.        Right eye: No discharge.         Left eye: No discharge.   Neck:      Vascular: No carotid bruit.   Cardiovascular:      Rate and Rhythm: Normal rate and regular rhythm.      Heart sounds: Normal heart sounds. No murmur heard.     No friction rub. No gallop.   Pulmonary:      Effort: Pulmonary effort is normal. No respiratory distress.      Breath sounds: Normal breath sounds. No stridor. No wheezing, rhonchi or rales.   Chest:      Chest wall: No tenderness.   Abdominal:      General: Bowel sounds are normal. There is no distension.      Palpations: Abdomen is soft. There is no mass.      Tenderness: There is no abdominal tenderness. There is no right CVA tenderness, left CVA tenderness, guarding or rebound.      Hernia: No hernia is present.   Musculoskeletal:      Cervical back: No rigidity or tenderness.      Right lower leg: No edema.      Left lower leg: No edema.   Lymphadenopathy:      Cervical: No cervical adenopathy.   Neurological:      Mental Status: She is alert and oriented to person, place, and time.   Psychiatric:         Behavior: Behavior normal.                 Assessment   Problem List Items Addressed This Visit  "   None  Visit Diagnoses       General medical exam    -  Primary    Relevant Orders    Lipid panel    TSH    Vitamin D 25 hydroxy    CBC and differential    Lipid screening        Relevant Orders    Lipid panel    TSH    Vitamin D 25 hydroxy    CBC and differential    Cholestatic hepatitis        Relevant Orders    Lipid panel    TSH    Vitamin D 25 hydroxy    CBC and differential    Age-related osteoporosis without current pathological fracture        Relevant Orders    Lipid panel    TSH    Vitamin D 25 hydroxy    CBC and differential    -healthy lifestyle. Stay active.   -cont rheum f/u.   -check labs as above.   -6/2024 chem12 per GI without acute findings, normal LFTS. Cont f/u with Dr. Keenan.   -update lipids.   -flu shot today. Did rec shingrix.   -mammo utd, f/b gyn-annual visit 12/2024. C-scope screening utd-see above.   -f/b ophtho.   -reach out with new sx/concerns.   -probable OAB-pt will f/u with pelvic floor PT. Hold meds for now. Cont gyn f/u.                 Jaspreet Goldstein MD  11/8/2024

## 2024-11-21 LAB
25(OH)D3+25(OH)D2 SERPL-MCNC: 67.9 NG/ML (ref 30–100)
BASOPHILS # BLD AUTO: 0.1 X10E3/UL (ref 0–0.2)
BASOPHILS NFR BLD AUTO: 1 %
CHOLEST SERPL-MCNC: 198 MG/DL (ref 100–199)
EOSINOPHIL # BLD AUTO: 0.1 X10E3/UL (ref 0–0.4)
EOSINOPHIL NFR BLD AUTO: 1 %
ERYTHROCYTE [DISTWIDTH] IN BLOOD BY AUTOMATED COUNT: 11.7 % (ref 11.7–15.4)
HCT VFR BLD AUTO: 38.1 % (ref 34–46.6)
HDLC SERPL-MCNC: 65 MG/DL
HGB BLD-MCNC: 12.5 G/DL (ref 11.1–15.9)
IMM GRANULOCYTES # BLD AUTO: 0 X10E3/UL (ref 0–0.1)
IMM GRANULOCYTES NFR BLD AUTO: 0 %
LDLC SERPL CALC-MCNC: 122 MG/DL (ref 0–99)
LYMPHOCYTES # BLD AUTO: 1.5 X10E3/UL (ref 0.7–3.1)
LYMPHOCYTES NFR BLD AUTO: 25 %
MCH RBC QN AUTO: 30.5 PG (ref 26.6–33)
MCHC RBC AUTO-ENTMCNC: 32.8 G/DL (ref 31.5–35.7)
MCV RBC AUTO: 93 FL (ref 79–97)
MONOCYTES # BLD AUTO: 0.4 X10E3/UL (ref 0.1–0.9)
MONOCYTES NFR BLD AUTO: 6 %
NEUTROPHILS # BLD AUTO: 3.9 X10E3/UL (ref 1.4–7)
NEUTROPHILS NFR BLD AUTO: 67 %
PLATELET # BLD AUTO: 314 X10E3/UL (ref 150–450)
RBC # BLD AUTO: 4.1 X10E6/UL (ref 3.77–5.28)
TRIGL SERPL-MCNC: 61 MG/DL (ref 0–149)
TSH SERPL DL<=0.005 MIU/L-ACNC: 2.56 UIU/ML (ref 0.45–4.5)
VLDLC SERPL CALC-MCNC: 11 MG/DL (ref 5–40)
WBC # BLD AUTO: 5.9 X10E3/UL (ref 3.4–10.8)

## 2025-01-06 ENCOUNTER — OFFICE VISIT (OUTPATIENT)
Dept: OBSTETRICS AND GYNECOLOGY | Facility: CLINIC | Age: 60
End: 2025-01-06
Payer: COMMERCIAL

## 2025-01-06 VITALS
WEIGHT: 124 LBS | HEIGHT: 63 IN | DIASTOLIC BLOOD PRESSURE: 70 MMHG | BODY MASS INDEX: 21.97 KG/M2 | SYSTOLIC BLOOD PRESSURE: 124 MMHG

## 2025-01-06 DIAGNOSIS — Z12.31 ENCOUNTER FOR SCREENING MAMMOGRAM FOR BREAST CANCER: Primary | ICD-10-CM

## 2025-01-06 DIAGNOSIS — Z01.419 ENCOUNTER FOR GYNECOLOGICAL EXAMINATION WITHOUT ABNORMAL FINDING: ICD-10-CM

## 2025-01-06 PROCEDURE — 99459 PELVIC EXAMINATION: CPT | Performed by: OBSTETRICS & GYNECOLOGY

## 2025-01-06 PROCEDURE — 99396 PREV VISIT EST AGE 40-64: CPT | Performed by: OBSTETRICS & GYNECOLOGY

## 2025-01-06 PROCEDURE — 3008F BODY MASS INDEX DOCD: CPT | Performed by: OBSTETRICS & GYNECOLOGY

## 2025-01-06 RX ORDER — URSODIOL 500 MG/1
TABLET, FILM COATED ORAL
COMMUNITY
Start: 2024-12-16

## 2025-01-06 NOTE — PROGRESS NOTES
Visit Date: 01/06/2025      Consent obtained from patient and all parties present in the room?   yes     I have obtained the consent of everyone present in the room to make   an audio recording of this visit to assist me in documenting the   encounter in the EMR.     Mary Brandon is 59 y.o. female presenting today for Annual GYN Exam      HPI:  No LMP recorded. Patient is postmenopausal.  Menstrual Cycle: Patient's last menstrual period was approximately 11 years ago. She is not experiencing any vaginal bleeding, spotting, or pink discharge.  Sexually Activity: Patient is sexually active with difficulty. Some dryness. Not bothersome to her.   Bowel and Bladder function: Normal per patient.  Pap Smears: does not have a history of abnormal pap smears, Last pap was in 12/23 and was neg/neg.   Patient is not experiencing hot flashes.  Patient is not experiencing night sweats.  Patient is experiencing vaginal dryness.  Patient  does not report any breast issues.  Patient's Last Mammogram was in 9/24 and was  Bi-rads 1. Breast Density Type: C - Heterogeneously Dense. Ignacia Model Risk 9.7%  Patient's Last Dexa Scan was in 8/22 and was  Osteoporosis. She is on any medications for bone loss prevention. She is taking Calcium and Vitamin D supplementation.   Patient does have a family history of breast or gyn cancers.  Patient  does not desire screening for STIs today.   Patient does not have a history of Domestic Violence/Verbal Abuse/Sexual Assault. She does feel safe in her current environment.     History of Present Illness  She is sexually active and experiences occasional discomfort during intercourse due to vaginal dryness, for which she has procured products from [facility].     Past Medical History:  has a past medical history of Breast cancer screening by mammogram (09/14/2023), Cervical cancer screening (11/30/2020), Chicken pox, H/O bone density study (08/2022), and Osteoporosis.    Past Surgical  History:  has a past surgical history that includes Tonsillectomy; Liver biopsy; [place] tooth extraction (1981); and Colonoscopy (09/2021).    Medications:   Current Outpatient Medications:     ursodioL (ACTIGALL) 500 mg tablet, take 1 tablet by mouth every day for 90 days, Disp: , Rfl:     cetirizine (ZyrTEC) 10 mg tablet, Take 10 mg by mouth as needed for allergies., Disp: , Rfl:     cholecalciferol, vitamin D3, (VITAMIN D3 ORAL), Take 2,000 mg by mouth daily., Disp: , Rfl:     fluticasone propionate (FLONASE) 50 mcg/actuation nasal spray, Administer 2 sprays into each nostril daily., Disp: 16 g, Rfl: 3    multivitamin tablet, Take 1 tablet by mouth daily., Disp: , Rfl:     RED YEAST RICE ORAL, Take by mouth., Disp: , Rfl:       Allergies: has No Known Allergies.     Family History: family history includes BRCA1 Negative in her biological sister; BRCA2 Negative in her biological sister; Heart disease in her biological father; Hypertension in her biological mother; Leukemia in an other family member; Ovarian cancer in her biological sister; Stroke in her biological father.    Social History:   Social History     Tobacco Use    Smoking status: Never    Smokeless tobacco: Never   Substance Use Topics    Alcohol use: No    Drug use: No       Review of Systems  Constitutional:   No hot flashes.   No night sweats.   No unexpected weight changes.  HENT:   No oral ulcers.   No headaches related to menstrual cycle.   Breasts:   No changes to skin of the breast or nipple.   No nipple discharge.   No breast lumps/cysts.   No breast pain.   Patient has not noticed any changes to breasts.   Respiratory:   No shortness of breath.  Cardiovascular:   No chest pain  Gastrointestinal:   No changes in bowel habits.  Genitourinary:   No pain with urination.   Yes urgency with urination.   No increased frequency of urination.   Yes urinary incontinence.   Yes vaginal dryness.  No vaginal discharge.   No painful intercourse.   No  "genital sores.   No irregular menstrual cycles.   No heavy menstrual cycles.   No painful menstrual periods.   No bleeding between menstrual cycles.   No bleeding after intercourse.  Yes absence of menstrual periods.  Integument:   No changes to any existing genital skin lesions or moles.   No new vaginal skin lesions or moles.   No genital rashes.   Endocrine:   No increased hair growth   Psychiatric:   Yes anxiety.   Yes depression.   No suicidal ideation.  Patient does not have concerns for her safety.   Heme-Lymph:   No easy bruising.   No unexplained lumps.           Visit Vitals  /70 (BP Location: Left upper arm, Patient Position: Sitting)   Ht 1.6 m (5' 3\")   Wt 56.2 kg (124 lb)   BMI 21.97 kg/m²       OBGyn Exam  General Appearance: Alert, cooperative, no acute distress  Head: Normocephalic, without obvious abnormality  Breast: Right breast normal without mass, skin or nipple changes or axillary nodes. Left breast normal without mass, skin or nipple changes or axillary nodes.  Abdomen: Soft, nontender, nondistended,no masses, no organomegaly  Pelvic exam: VULVA: normal appearing vulva with no masses, tenderness or lesions. VAGINA: normal appearing vagina with normal color and discharge, no lesions. CERVIX: normal appearing cervix without discharge or lesions. UTERUS: uterus is normal size, shape, consistency and non-tender. ADNEXA: normal adnexa in size, nontender and no masses.  Extremities: no edema     exam Pelvic exam conducted with staff chaperone present. Staff chaperone name: Valeriy .    Assessment and Plan:  59 y.o. yo presents for an annual exam.   1. Pap  not collected today.  2. STI Testing: GC/CT declined. HIV/SA/Hep B declined.  3. Mammogram slip  provided today.  4. Dexa slip  not indicated today.   6. Return to office 1 year or prn  7. Patient counseled to look in portal or to call office for results of any labs collected 7 days after visit.   8. Osteoporosis  - Received " intravenous treatment in February 2024  - Advised to follow up with rheumatologist to determine timing of next DEXA scan  - Will inform if a more recent scan is recommended, and we will place the order    In addition to the patient's preventative care visit, the following problems were also discussed.     Noemí Gamez MD

## (undated) DEVICE — SNARE COLD EXACTO9MM

## (undated) DEVICE — FORCEP COLD RADIAL JAW

## (undated) DEVICE — HEMOCLIP ENDOSCOPIC INSTINCT